# Patient Record
Sex: MALE | Race: WHITE | Employment: OTHER | URBAN - NONMETROPOLITAN AREA
[De-identification: names, ages, dates, MRNs, and addresses within clinical notes are randomized per-mention and may not be internally consistent; named-entity substitution may affect disease eponyms.]

---

## 2017-01-01 ENCOUNTER — APPOINTMENT (OUTPATIENT)
Dept: GENERAL RADIOLOGY | Age: 82
DRG: 690 | End: 2017-01-01
Payer: MEDICARE

## 2017-01-01 ENCOUNTER — PREP FOR PROCEDURE (OUTPATIENT)
Dept: CARDIOLOGY | Age: 82
End: 2017-01-01

## 2017-01-01 ENCOUNTER — HOSPITAL ENCOUNTER (EMERGENCY)
Age: 82
Discharge: HOME | End: 2017-07-29
Payer: MEDICARE

## 2017-01-01 ENCOUNTER — NURSE ONLY (OUTPATIENT)
Dept: CARDIOLOGY CLINIC | Age: 82
End: 2017-01-01
Payer: MEDICARE

## 2017-01-01 ENCOUNTER — HOSPITAL ENCOUNTER (EMERGENCY)
Age: 82
End: 2017-11-13
Attending: FAMILY MEDICINE
Payer: MEDICARE

## 2017-01-01 ENCOUNTER — APPOINTMENT (OUTPATIENT)
Dept: GENERAL RADIOLOGY | Age: 82
End: 2017-01-01
Attending: INTERNAL MEDICINE
Payer: MEDICARE

## 2017-01-01 ENCOUNTER — APPOINTMENT (OUTPATIENT)
Dept: CT IMAGING | Age: 82
DRG: 690 | End: 2017-01-01
Payer: MEDICARE

## 2017-01-01 ENCOUNTER — APPOINTMENT (OUTPATIENT)
Dept: CT IMAGING | Age: 82
End: 2017-01-01
Payer: MEDICARE

## 2017-01-01 ENCOUNTER — HOSPITAL ENCOUNTER (OUTPATIENT)
Dept: NON INVASIVE DIAGNOSTICS | Age: 82
Discharge: HOME | End: 2017-08-23
Payer: MEDICARE

## 2017-01-01 ENCOUNTER — APPOINTMENT (OUTPATIENT)
Dept: GENERAL RADIOLOGY | Age: 82
End: 2017-01-01
Payer: MEDICARE

## 2017-01-01 ENCOUNTER — TELEPHONE (OUTPATIENT)
Dept: PHARMACY | Age: 82
End: 2017-01-01

## 2017-01-01 ENCOUNTER — HOSPITAL ENCOUNTER (EMERGENCY)
Age: 82
Discharge: HOME | End: 2017-07-20
Attending: FAMILY MEDICINE
Payer: MEDICARE

## 2017-01-01 ENCOUNTER — TELEPHONE (OUTPATIENT)
Dept: CARDIOLOGY CLINIC | Age: 82
End: 2017-01-01

## 2017-01-01 ENCOUNTER — HOSPITAL ENCOUNTER (INPATIENT)
Age: 82
LOS: 4 days | Discharge: NURSING, REHAB OR LONG-TERM CARE | DRG: 690 | End: 2017-11-12
Attending: EMERGENCY MEDICINE | Admitting: INTERNAL MEDICINE
Payer: MEDICARE

## 2017-01-01 ENCOUNTER — OFFICE VISIT (OUTPATIENT)
Dept: CARDIOLOGY CLINIC | Age: 82
End: 2017-01-01
Payer: MEDICARE

## 2017-01-01 ENCOUNTER — HOSPITAL ENCOUNTER (EMERGENCY)
Age: 82
Discharge: HOME | End: 2017-11-03
Attending: INTERNAL MEDICINE
Payer: MEDICARE

## 2017-01-01 ENCOUNTER — HOSPITAL ENCOUNTER (OUTPATIENT)
Age: 82
Setting detail: OUTPATIENT SURGERY
Discharge: HOME | End: 2017-07-17
Attending: INTERNAL MEDICINE | Admitting: INTERNAL MEDICINE
Payer: MEDICARE

## 2017-01-01 ENCOUNTER — HOSPITAL ENCOUNTER (OUTPATIENT)
Dept: INPATIENT UNIT | Age: 82
Discharge: HOME | End: 2017-09-21
Attending: INTERNAL MEDICINE | Admitting: INTERNAL MEDICINE
Payer: MEDICARE

## 2017-01-01 VITALS
SYSTOLIC BLOOD PRESSURE: 157 MMHG | OXYGEN SATURATION: 100 % | TEMPERATURE: 98.1 F | DIASTOLIC BLOOD PRESSURE: 83 MMHG | BODY MASS INDEX: 18.61 KG/M2 | WEIGHT: 130 LBS | HEIGHT: 70 IN | RESPIRATION RATE: 16 BRPM | HEART RATE: 59 BPM

## 2017-01-01 VITALS
SYSTOLIC BLOOD PRESSURE: 122 MMHG | HEART RATE: 60 BPM | BODY MASS INDEX: 17.75 KG/M2 | HEIGHT: 70 IN | WEIGHT: 124 LBS | DIASTOLIC BLOOD PRESSURE: 60 MMHG

## 2017-01-01 VITALS
HEART RATE: 63 BPM | OXYGEN SATURATION: 96 % | TEMPERATURE: 98.6 F | HEIGHT: 69 IN | RESPIRATION RATE: 18 BRPM | SYSTOLIC BLOOD PRESSURE: 148 MMHG | DIASTOLIC BLOOD PRESSURE: 67 MMHG | BODY MASS INDEX: 19.54 KG/M2 | WEIGHT: 131.9 LBS

## 2017-01-01 VITALS
TEMPERATURE: 98 F | RESPIRATION RATE: 16 BRPM | OXYGEN SATURATION: 96 % | SYSTOLIC BLOOD PRESSURE: 139 MMHG | BODY MASS INDEX: 17.47 KG/M2 | WEIGHT: 122 LBS | HEART RATE: 60 BPM | DIASTOLIC BLOOD PRESSURE: 70 MMHG | HEIGHT: 70 IN

## 2017-01-01 VITALS
SYSTOLIC BLOOD PRESSURE: 149 MMHG | DIASTOLIC BLOOD PRESSURE: 63 MMHG | OXYGEN SATURATION: 99 % | RESPIRATION RATE: 18 BRPM | HEIGHT: 70 IN | TEMPERATURE: 98.2 F | HEART RATE: 59 BPM | BODY MASS INDEX: 19.33 KG/M2 | WEIGHT: 135 LBS

## 2017-01-01 VITALS
WEIGHT: 122 LBS | OXYGEN SATURATION: 98 % | BODY MASS INDEX: 17.47 KG/M2 | SYSTOLIC BLOOD PRESSURE: 195 MMHG | HEART RATE: 60 BPM | TEMPERATURE: 99 F | RESPIRATION RATE: 18 BRPM | DIASTOLIC BLOOD PRESSURE: 80 MMHG | HEIGHT: 70 IN

## 2017-01-01 VITALS
DIASTOLIC BLOOD PRESSURE: 59 MMHG | HEART RATE: 60 BPM | SYSTOLIC BLOOD PRESSURE: 94 MMHG | BODY MASS INDEX: 18.61 KG/M2 | WEIGHT: 130 LBS | HEIGHT: 70 IN

## 2017-01-01 VITALS
RESPIRATION RATE: 18 BRPM | HEART RATE: 60 BPM | WEIGHT: 122 LBS | HEIGHT: 70 IN | DIASTOLIC BLOOD PRESSURE: 75 MMHG | BODY MASS INDEX: 17.47 KG/M2 | SYSTOLIC BLOOD PRESSURE: 157 MMHG | TEMPERATURE: 98.2 F | OXYGEN SATURATION: 95 %

## 2017-01-01 VITALS
RESPIRATION RATE: 12 BRPM | DIASTOLIC BLOOD PRESSURE: 95 MMHG | OXYGEN SATURATION: 78 % | SYSTOLIC BLOOD PRESSURE: 149 MMHG | HEART RATE: 30 BPM | TEMPERATURE: 94.1 F

## 2017-01-01 VITALS
HEIGHT: 70 IN | DIASTOLIC BLOOD PRESSURE: 62 MMHG | WEIGHT: 125.6 LBS | SYSTOLIC BLOOD PRESSURE: 112 MMHG | HEART RATE: 60 BPM | BODY MASS INDEX: 17.98 KG/M2

## 2017-01-01 DIAGNOSIS — I25.810 CORONARY ARTERY DISEASE INVOLVING CORONARY BYPASS GRAFT OF NATIVE HEART WITHOUT ANGINA PECTORIS: Primary | ICD-10-CM

## 2017-01-01 DIAGNOSIS — I10 ESSENTIAL HYPERTENSION: ICD-10-CM

## 2017-01-01 DIAGNOSIS — Z95.0 S/P PLACEMENT OF CARDIAC PACEMAKER: ICD-10-CM

## 2017-01-01 DIAGNOSIS — E78.00 PURE HYPERCHOLESTEROLEMIA: ICD-10-CM

## 2017-01-01 DIAGNOSIS — S09.90XA INJURY OF HEAD, INITIAL ENCOUNTER: Primary | ICD-10-CM

## 2017-01-01 DIAGNOSIS — I48.92 PAROXYSMAL ATRIAL FLUTTER (HCC): ICD-10-CM

## 2017-01-01 DIAGNOSIS — I35.0 MODERATE TO SEVERE AORTIC STENOSIS: ICD-10-CM

## 2017-01-01 DIAGNOSIS — I35.0 MODERATE TO SEVERE AORTIC STENOSIS: Primary | ICD-10-CM

## 2017-01-01 DIAGNOSIS — R10.9 ACUTE RIGHT FLANK PAIN: Primary | ICD-10-CM

## 2017-01-01 DIAGNOSIS — I46.9 CARDIOPULMONARY ARREST (HCC): Primary | ICD-10-CM

## 2017-01-01 DIAGNOSIS — I50.32 HEART FAILURE, DIASTOLIC, CHRONIC (HCC): ICD-10-CM

## 2017-01-01 DIAGNOSIS — T14.8XXA SKIN AVULSION: ICD-10-CM

## 2017-01-01 DIAGNOSIS — I25.810 CORONARY ARTERY DISEASE INVOLVING CORONARY BYPASS GRAFT OF NATIVE HEART WITHOUT ANGINA PECTORIS: ICD-10-CM

## 2017-01-01 DIAGNOSIS — Z95.0 PACEMAKER: Primary | ICD-10-CM

## 2017-01-01 DIAGNOSIS — Z95.820 S/P ANGIOPLASTY WITH STENT: Primary | ICD-10-CM

## 2017-01-01 DIAGNOSIS — Z95.5 STENTED CORONARY ARTERY: ICD-10-CM

## 2017-01-01 DIAGNOSIS — N30.00 ACUTE CYSTITIS WITHOUT HEMATURIA: ICD-10-CM
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
## 2017-01-01 DIAGNOSIS — R51.9 NONINTRACTABLE HEADACHE, UNSPECIFIED CHRONICITY PATTERN, UNSPECIFIED HEADACHE TYPE: ICD-10-CM

## 2017-01-01 DIAGNOSIS — S51.802A AVULSION OF SKIN OF FOREARM, LEFT, INITIAL ENCOUNTER: Primary | ICD-10-CM

## 2017-01-01 DIAGNOSIS — I35.0 SEVERE AORTIC STENOSIS: ICD-10-CM

## 2017-01-01 DIAGNOSIS — Z95.1 HX OF CABG: ICD-10-CM

## 2017-01-01 DIAGNOSIS — Z98.890 S/P CARDIAC CATH: ICD-10-CM

## 2017-01-01 DIAGNOSIS — T07.XXXA MULTIPLE CONTUSIONS: ICD-10-CM

## 2017-01-01 LAB
ABO: NORMAL
ACTIVATED CLOTTING TIME: 345 SECONDS (ref 1–150)
ALBUMIN SERPL-MCNC: 3.2 G/DL (ref 3.5–5.1)
ALP BLD-CCNC: 106 U/L (ref 38–126)
ALT SERPL-CCNC: 16 U/L (ref 11–66)
ANION GAP SERPL CALCULATED.3IONS-SCNC: 11 MEQ/L (ref 8–16)
ANION GAP SERPL CALCULATED.3IONS-SCNC: 12 MEQ/L (ref 8–16)
ANION GAP SERPL CALCULATED.3IONS-SCNC: 15 MEQ/L (ref 8–16)
ANISOCYTOSIS: ABNORMAL
ANISOCYTOSIS: ABNORMAL
ANTIBODY SCREEN: NORMAL
APTT: 26 SECONDS (ref 22–38)
APTT: 28.8 SECONDS (ref 22–38)
AST SERPL-CCNC: 27 U/L (ref 5–40)
BACTERIA: ABNORMAL
BACTERIA: ABNORMAL /HPF
BASOPHILS # BLD: 0.6 %
BASOPHILS # BLD: 0.7 %
BASOPHILS # BLD: 1.1 %
BASOPHILS ABSOLUTE: 0 THOU/MM3 (ref 0–0.1)
BASOPHILS ABSOLUTE: 0 THOU/MM3 (ref 0–0.1)
BASOPHILS ABSOLUTE: 0.1 THOU/MM3 (ref 0–0.1)
BILIRUB SERPL-MCNC: 0.7 MG/DL (ref 0.3–1.2)
BILIRUBIN URINE: NEGATIVE
BILIRUBIN URINE: NEGATIVE
BLOOD, URINE: NEGATIVE
BLOOD, URINE: NEGATIVE
BUN BLDV-MCNC: 21 MG/DL (ref 7–22)
BUN BLDV-MCNC: 22 MG/DL (ref 7–22)
BUN BLDV-MCNC: 22 MG/DL (ref 7–22)
BUN BLDV-MCNC: 23 MG/DL (ref 7–22)
BUN BLDV-MCNC: 25 MG/DL (ref 7–22)
BUN BLDV-MCNC: 27 MG/DL (ref 7–22)
BUN BLDV-MCNC: 27 MG/DL (ref 7–22)
BUN BLDV-MCNC: 35 MG/DL (ref 7–22)
BUN BLDV-MCNC: 37 MG/DL (ref 7–22)
CALCIUM IONIZED: 1.14 MMOL/L (ref 1.12–1.32)
CALCIUM SERPL-MCNC: 8.6 MG/DL (ref 8.5–10.5)
CALCIUM SERPL-MCNC: 8.8 MG/DL (ref 8.5–10.5)
CALCIUM SERPL-MCNC: 8.8 MG/DL (ref 8.5–10.5)
CALCIUM SERPL-MCNC: 8.9 MG/DL (ref 8.5–10.5)
CALCIUM SERPL-MCNC: 9 MG/DL (ref 8.5–10.5)
CALCIUM SERPL-MCNC: 9.5 MG/DL (ref 8.5–10.5)
CALCIUM SERPL-MCNC: 9.5 MG/DL (ref 8.5–10.5)
CALCIUM SERPL-MCNC: 9.8 MG/DL (ref 8.5–10.5)
CALCIUM SERPL-MCNC: 9.9 MG/DL (ref 8.5–10.5)
CASTS 2: ABNORMAL /LPF
CASTS UA: ABNORMAL /LPF
CASTS: ABNORMAL /LPF
CASTS: ABNORMAL /LPF
CHARACTER, URINE: ABNORMAL
CHARACTER, URINE: ABNORMAL
CHLORIDE BLD-SCNC: 101 MEQ/L (ref 98–111)
CHLORIDE BLD-SCNC: 102 MEQ/L (ref 98–111)
CHLORIDE BLD-SCNC: 103 MEQ/L (ref 98–111)
CHLORIDE BLD-SCNC: 103 MEQ/L (ref 98–111)
CHLORIDE BLD-SCNC: 104 MEQ/L (ref 98–111)
CHLORIDE BLD-SCNC: 99 MEQ/L (ref 98–111)
CO2: 17 MEQ/L (ref 23–33)
CO2: 18 MEQ/L (ref 23–33)
CO2: 19 MEQ/L (ref 23–33)
CO2: 22 MEQ/L (ref 23–33)
CO2: 22 MEQ/L (ref 23–33)
CO2: 23 MEQ/L (ref 23–33)
CO2: 24 MEQ/L (ref 23–33)
COLLECTED BY:: ABNORMAL
COLOR: YELLOW
COLOR: YELLOW
CREAT SERPL-MCNC: 0.5 MG/DL (ref 0.4–1.2)
CREAT SERPL-MCNC: 0.6 MG/DL (ref 0.4–1.2)
CREAT SERPL-MCNC: 0.7 MG/DL (ref 0.4–1.2)
CREAT SERPL-MCNC: 0.8 MG/DL (ref 0.4–1.2)
CRYSTALS, UA: ABNORMAL
CRYSTALS: ABNORMAL
EKG ATRIAL RATE: 326 BPM
EKG ATRIAL RATE: 394 BPM
EKG ATRIAL RATE: 60 BPM
EKG Q-T INTERVAL: 460 MS
EKG Q-T INTERVAL: 476 MS
EKG Q-T INTERVAL: 478 MS
EKG QRS DURATION: 158 MS
EKG QRS DURATION: 162 MS
EKG QRS DURATION: 168 MS
EKG QTC CALCULATION (BAZETT): 460 MS
EKG QTC CALCULATION (BAZETT): 476 MS
EKG QTC CALCULATION (BAZETT): 478 MS
EKG R AXIS: -77 DEGREES
EKG R AXIS: -77 DEGREES
EKG R AXIS: -79 DEGREES
EKG T AXIS: 88 DEGREES
EKG T AXIS: 90 DEGREES
EKG T AXIS: 91 DEGREES
EKG VENTRICULAR RATE: 60 BPM
EOSINOPHIL # BLD: 0 %
EOSINOPHIL # BLD: 0.1 %
EOSINOPHIL # BLD: 1.6 %
EOSINOPHILS ABSOLUTE: 0 THOU/MM3 (ref 0–0.4)
EOSINOPHILS ABSOLUTE: 0 THOU/MM3 (ref 0–0.4)
EOSINOPHILS ABSOLUTE: 0.1 THOU/MM3 (ref 0–0.4)
EPITHELIAL CELLS, UA: ABNORMAL /HPF
EPITHELIAL CELLS, UA: ABNORMAL /HPF
GFR SERPL CREATININE-BSD FRML MDRD: > 90 ML/MIN/1.73M2
GLUCOSE BLD-MCNC: 103 MG/DL (ref 70–108)
GLUCOSE BLD-MCNC: 124 MG/DL (ref 70–108)
GLUCOSE BLD-MCNC: 125 MG/DL (ref 70–108)
GLUCOSE BLD-MCNC: 133 MG/DL (ref 70–108)
GLUCOSE BLD-MCNC: 133 MG/DL (ref 70–108)
GLUCOSE BLD-MCNC: 135 MG/DL (ref 70–108)
GLUCOSE BLD-MCNC: 140 MG/DL (ref 70–108)
GLUCOSE BLD-MCNC: 172 MG/DL (ref 70–108)
GLUCOSE BLD-MCNC: 185 MG/DL (ref 70–108)
GLUCOSE BLD-MCNC: 194 MG/DL (ref 70–108)
GLUCOSE BLD-MCNC: 200 MG/DL (ref 70–108)
GLUCOSE BLD-MCNC: 216 MG/DL (ref 70–108)
GLUCOSE BLD-MCNC: 220 MG/DL (ref 70–108)
GLUCOSE BLD-MCNC: 225 MG/DL (ref 70–108)
GLUCOSE BLD-MCNC: 231 MG/DL (ref 70–108)
GLUCOSE BLD-MCNC: 235 MG/DL (ref 70–108)
GLUCOSE BLD-MCNC: 237 MG/DL (ref 70–108)
GLUCOSE BLD-MCNC: 237 MG/DL (ref 70–108)
GLUCOSE BLD-MCNC: 238 MG/DL (ref 70–108)
GLUCOSE BLD-MCNC: 242 MG/DL (ref 70–108)
GLUCOSE BLD-MCNC: 246 MG/DL (ref 70–108)
GLUCOSE BLD-MCNC: 252 MG/DL (ref 70–108)
GLUCOSE BLD-MCNC: 274 MG/DL (ref 70–108)
GLUCOSE BLD-MCNC: 280 MG/DL (ref 70–108)
GLUCOSE BLD-MCNC: 283 MG/DL (ref 70–108)
GLUCOSE BLD-MCNC: 287 MG/DL (ref 70–108)
GLUCOSE BLD-MCNC: 297 MG/DL (ref 70–108)
GLUCOSE BLD-MCNC: 302 MG/DL (ref 70–108)
GLUCOSE BLD-MCNC: 324 MG/DL (ref 70–108)
GLUCOSE BLD-MCNC: 348 MG/DL (ref 70–108)
GLUCOSE BLD-MCNC: 350 MG/DL (ref 70–108)
GLUCOSE BLD-MCNC: 74 MG/DL (ref 70–108)
GLUCOSE BLD-MCNC: 87 MG/DL (ref 70–108)
GLUCOSE BLD-MCNC: 92 MG/DL (ref 70–108)
GLUCOSE BLD-MCNC: 94 MG/DL (ref 70–108)
GLUCOSE URINE: NEGATIVE MG/DL
GLUCOSE, URINE: 100 MG/DL
HCT VFR BLD CALC: 25.3 % (ref 42–52)
HCT VFR BLD CALC: 26.1 % (ref 42–52)
HCT VFR BLD CALC: 27.8 % (ref 42–52)
HCT VFR BLD CALC: 27.8 % (ref 42–52)
HCT VFR BLD CALC: 28.4 % (ref 42–52)
HCT VFR BLD CALC: 29.3 % (ref 42–52)
HCT VFR BLD CALC: 29.8 % (ref 42–52)
HCT VFR BLD CALC: 30.7 % (ref 42–52)
HEMOCCULT STL QL: NEGATIVE
HEMOGLOBIN: 10 GM/DL (ref 14–18)
HEMOGLOBIN: 8.2 GM/DL (ref 14–18)
HEMOGLOBIN: 8.6 GM/DL (ref 14–18)
HEMOGLOBIN: 9.1 GM/DL (ref 14–18)
HEMOGLOBIN: 9.3 GM/DL (ref 14–18)
HEMOGLOBIN: 9.5 GM/DL (ref 14–18)
HEMOGLOBIN: 9.6 GM/DL (ref 14–18)
HEMOGLOBIN: 9.9 GM/DL (ref 14–18)
HYPOCHROMIA: ABNORMAL
INR BLD: 1 (ref 0.85–1.13)
INR BLD: 1.13 (ref 0.85–1.13)
INR BLD: 2.09 (ref 0.85–1.13)
INR BLD: 2.24 (ref 0.85–1.13)
INR BLD: 2.53 (ref 0.85–1.13)
INR BLD: 2.73 (ref 0.85–1.13)
INR BLD: 3.19 (ref 0.85–1.13)
INR BLD: 3.62 (ref 0.85–1.13)
KETONES, URINE: ABNORMAL
KETONES, URINE: NEGATIVE
LACTIC ACID: 1.9 MMOL/L (ref 0.5–2.2)
LACTIC ACID: 2.4 MMOL/L (ref 0.5–2.2)
LACTIC ACID: 2.4 MMOL/L (ref 0.5–2.2)
LACTIC ACID: 2.5 MMOL/L (ref 0.5–2.2)
LACTIC ACID: 2.6 MMOL/L (ref 0.5–2.2)
LACTIC ACID: 3.2 MMOL/L (ref 0.5–2.2)
LACTIC ACID: 3.3 MMOL/L (ref 0.5–2.2)
LACTIC ACID: 3.5 MMOL/L (ref 0.5–2.2)
LACTIC ACID: 3.6 MMOL/L (ref 0.5–2.2)
LEUKOCYTE ESTERASE, URINE: ABNORMAL
LEUKOCYTE ESTERASE, URINE: ABNORMAL
LV EF: 60 %
LVEF MODALITY: NORMAL
LYMPHOCYTES # BLD: 12.7 %
LYMPHOCYTES # BLD: 24.9 %
LYMPHOCYTES # BLD: 32.1 %
LYMPHOCYTES ABSOLUTE: 1 THOU/MM3 (ref 1–4.8)
LYMPHOCYTES ABSOLUTE: 1.4 THOU/MM3 (ref 1–4.8)
LYMPHOCYTES ABSOLUTE: 2.1 THOU/MM3 (ref 1–4.8)
MAGNESIUM: 1.6 MG/DL (ref 1.6–2.4)
MAGNESIUM: 1.8 MG/DL (ref 1.6–2.4)
MAGNESIUM: 2.1 MG/DL (ref 1.6–2.4)
MCH RBC QN AUTO: 26.7 PG (ref 27–31)
MCH RBC QN AUTO: 26.9 PG (ref 27–31)
MCH RBC QN AUTO: 27.1 PG (ref 27–31)
MCH RBC QN AUTO: 27.3 PG (ref 27–31)
MCH RBC QN AUTO: 27.5 PG (ref 27–31)
MCH RBC QN AUTO: 27.6 PG (ref 27–31)
MCH RBC QN AUTO: 27.6 PG (ref 27–31)
MCH RBC QN AUTO: 30.8 PG (ref 27–31)
MCHC RBC AUTO-ENTMCNC: 32.5 GM/DL (ref 33–37)
MCHC RBC AUTO-ENTMCNC: 32.6 GM/DL (ref 33–37)
MCHC RBC AUTO-ENTMCNC: 32.8 GM/DL (ref 33–37)
MCHC RBC AUTO-ENTMCNC: 32.8 GM/DL (ref 33–37)
MCHC RBC AUTO-ENTMCNC: 33 GM/DL (ref 33–37)
MCHC RBC AUTO-ENTMCNC: 33.3 GM/DL (ref 33–37)
MCHC RBC AUTO-ENTMCNC: 33.3 GM/DL (ref 33–37)
MCHC RBC AUTO-ENTMCNC: 33.6 GM/DL (ref 33–37)
MCV RBC AUTO: 81.9 FL (ref 80–94)
MCV RBC AUTO: 82.2 FL (ref 80–94)
MCV RBC AUTO: 82.3 FL (ref 80–94)
MCV RBC AUTO: 82.7 FL (ref 80–94)
MCV RBC AUTO: 82.7 FL (ref 80–94)
MCV RBC AUTO: 83.2 FL (ref 80–94)
MCV RBC AUTO: 84.7 FL (ref 80–94)
MCV RBC AUTO: 91.5 FL (ref 80–94)
MISCELLANEOUS 2: ABNORMAL
MISCELLANEOUS LAB TEST RESULT: ABNORMAL
MONOCYTES # BLD: 6.2 %
MONOCYTES # BLD: 6.9 %
MONOCYTES # BLD: 9.1 %
MONOCYTES ABSOLUTE: 0.4 THOU/MM3 (ref 0.4–1.3)
MONOCYTES ABSOLUTE: 0.5 THOU/MM3 (ref 0.4–1.3)
MONOCYTES ABSOLUTE: 0.5 THOU/MM3 (ref 0.4–1.3)
MYOGLOBIN URINE: NEGATIVE
NITRITE, URINE: POSITIVE
NITRITE, URINE: POSITIVE
NUCLEATED RED BLOOD CELLS: 0 /100 WBC
ORGANISM: ABNORMAL
ORGANISM: ABNORMAL
OSMOLALITY CALCULATION: 288.2 MOSMOL/KG (ref 275–300)
OSMOLALITY CALCULATION: 289.3 MOSMOL/KG (ref 275–300)
OSMOLALITY CALCULATION: 292 MOSMOL/KG (ref 275–300)
PDW BLD-RTO: 14.2 % (ref 11.5–14.5)
PDW BLD-RTO: 15.5 % (ref 11.5–14.5)
PDW BLD-RTO: 17.5 % (ref 11.5–14.5)
PDW BLD-RTO: 17.8 % (ref 11.5–14.5)
PDW BLD-RTO: 18.2 % (ref 11.5–14.5)
PDW BLD-RTO: 18.7 % (ref 11.5–14.5)
PDW BLD-RTO: 18.7 % (ref 11.5–14.5)
PDW BLD-RTO: 19.1 % (ref 11.5–14.5)
PH UA: 6.5
PH UA: 8
PLATELET # BLD: 113 THOU/MM3 (ref 130–400)
PLATELET # BLD: 134 THOU/MM3 (ref 130–400)
PLATELET # BLD: 136 THOU/MM3 (ref 130–400)
PLATELET # BLD: 136 THOU/MM3 (ref 130–400)
PLATELET # BLD: 144 THOU/MM3 (ref 130–400)
PLATELET # BLD: 150 THOU/MM3 (ref 130–400)
PLATELET # BLD: 159 THOU/MM3 (ref 130–400)
PLATELET # BLD: 165 THOU/MM3 (ref 130–400)
PMV BLD AUTO: 10.4 MCM (ref 7.4–10.4)
PMV BLD AUTO: 10.5 MCM (ref 7.4–10.4)
PMV BLD AUTO: 10.6 MCM (ref 7.4–10.4)
PMV BLD AUTO: 10.6 MCM (ref 7.4–10.4)
PMV BLD AUTO: 10.8 MCM (ref 7.4–10.4)
PMV BLD AUTO: 11.1 MCM (ref 7.4–10.4)
PMV BLD AUTO: 11.1 MCM (ref 7.4–10.4)
PMV BLD AUTO: 11.3 MCM (ref 7.4–10.4)
POC O2 SATURATION: 55 % (ref 94–97)
POC O2 SATURATION: 55 % (ref 94–97)
POC O2 SATURATION: 57 % (ref 94–97)
POTASSIUM SERPL-SCNC: 3.5 MEQ/L (ref 3.5–5.2)
POTASSIUM SERPL-SCNC: 3.9 MEQ/L (ref 3.5–5.2)
POTASSIUM SERPL-SCNC: 4.1 MEQ/L (ref 3.5–5.2)
POTASSIUM SERPL-SCNC: 4.5 MEQ/L (ref 3.5–5.2)
POTASSIUM SERPL-SCNC: 4.6 MEQ/L (ref 3.5–5.2)
POTASSIUM SERPL-SCNC: 4.7 MEQ/L (ref 3.5–5.2)
POTASSIUM SERPL-SCNC: 4.9 MEQ/L (ref 3.5–5.2)
POTASSIUM SERPL-SCNC: 5.3 MEQ/L (ref 3.5–5.2)
POTASSIUM SERPL-SCNC: 5.6 MEQ/L (ref 3.5–5.2)
PRO-BNP: ABNORMAL PG/ML (ref 0–1800)
PROCALCITONIN: 0.07 NG/ML (ref 0.01–0.09)
PROTEIN UA: ABNORMAL
PROTEIN UA: ABNORMAL MG/DL
RBC # BLD: 3.08 MILL/MM3 (ref 4.7–6.1)
RBC # BLD: 3.1 MILL/MM3 (ref 4.7–6.1)
RBC # BLD: 3.17 MILL/MM3 (ref 4.7–6.1)
RBC # BLD: 3.34 MILL/MM3 (ref 4.7–6.1)
RBC # BLD: 3.36 MILL/MM3 (ref 4.7–6.1)
RBC # BLD: 3.58 MILL/MM3 (ref 4.7–6.1)
RBC # BLD: 3.61 MILL/MM3 (ref 4.7–6.1)
RBC # BLD: 3.62 MILL/MM3 (ref 4.7–6.1)
RBC # BLD: NORMAL 10*6/UL
RBC URINE: ABNORMAL /HPF
RBC URINE: ABNORMAL /HPF
RENAL EPITHELIAL, UA: ABNORMAL
RENAL EPITHELIAL, UA: ABNORMAL
RH FACTOR: NORMAL
SEG NEUTROPHILS: 58.3 %
SEG NEUTROPHILS: 65.3 %
SEG NEUTROPHILS: 80.4 %
SEGMENTED NEUTROPHILS ABSOLUTE COUNT: 3.7 THOU/MM3 (ref 1.8–7.7)
SEGMENTED NEUTROPHILS ABSOLUTE COUNT: 3.7 THOU/MM3 (ref 1.8–7.7)
SEGMENTED NEUTROPHILS ABSOLUTE COUNT: 6.2 THOU/MM3 (ref 1.8–7.7)
SODIUM BLD-SCNC: 133 MEQ/L (ref 135–145)
SODIUM BLD-SCNC: 135 MEQ/L (ref 135–145)
SODIUM BLD-SCNC: 135 MEQ/L (ref 135–145)
SODIUM BLD-SCNC: 136 MEQ/L (ref 135–145)
SODIUM BLD-SCNC: 136 MEQ/L (ref 135–145)
SODIUM BLD-SCNC: 137 MEQ/L (ref 135–145)
SODIUM BLD-SCNC: 137 MEQ/L (ref 135–145)
SODIUM BLD-SCNC: 138 MEQ/L (ref 135–145)
SODIUM BLD-SCNC: 138 MEQ/L (ref 135–145)
SOURCE, BLOOD GAS: ABNORMAL
SPECIFIC GRAVITY UA: 1.02 (ref 1–1.03)
SPECIFIC GRAVITY, URINE: 1.02 (ref 1–1.03)
TOTAL CK: 97 U/L (ref 55–170)
TOTAL PROTEIN: 6.7 G/DL (ref 6.1–8)
TROPONIN T: 0.06 NG/ML
TSH SERPL DL<=0.05 MIU/L-ACNC: 3.02 UIU/ML (ref 0.4–4.2)
URINE CULTURE REFLEX: ABNORMAL
URINE CULTURE, ROUTINE: ABNORMAL
UROBILINOGEN, URINE: 1 EU/DL
UROBILINOGEN, URINE: 1 EU/DL
WBC # BLD: 10 THOU/MM3 (ref 4.8–10.8)
WBC # BLD: 5.5 THOU/MM3 (ref 4.8–10.8)
WBC # BLD: 5.6 THOU/MM3 (ref 4.8–10.8)
WBC # BLD: 6.4 THOU/MM3 (ref 4.8–10.8)
WBC # BLD: 6.7 THOU/MM3 (ref 4.8–10.8)
WBC # BLD: 7.1 THOU/MM3 (ref 4.8–10.8)
WBC # BLD: 7.7 THOU/MM3 (ref 4.8–10.8)
WBC # BLD: 8.8 THOU/MM3 (ref 4.8–10.8)
WBC UA: ABNORMAL /HPF
WBC UA: ABNORMAL /HPF
YEAST: ABNORMAL
YEAST: ABNORMAL

## 2017-01-01 PROCEDURE — 49465 FLUORO EXAM OF G/COLON TUBE: CPT

## 2017-01-01 PROCEDURE — G8988 SELF CARE GOAL STATUS: HCPCS

## 2017-01-01 PROCEDURE — 73600 X-RAY EXAM OF ANKLE: CPT

## 2017-01-01 PROCEDURE — 93000 ELECTROCARDIOGRAM COMPLETE: CPT | Performed by: INTERNAL MEDICINE

## 2017-01-01 PROCEDURE — 1200000003 HC TELEMETRY R&B

## 2017-01-01 PROCEDURE — 2580000003 HC RX 258: Performed by: NURSE PRACTITIONER

## 2017-01-01 PROCEDURE — 97110 THERAPEUTIC EXERCISES: CPT

## 2017-01-01 PROCEDURE — 2500000003 HC RX 250 WO HCPCS: Performed by: INTERNAL MEDICINE

## 2017-01-01 PROCEDURE — 2580000003 HC RX 258: Performed by: INTERNAL MEDICINE

## 2017-01-01 PROCEDURE — A6446 CONFORM BAND S W>=3" <5"/YD: HCPCS

## 2017-01-01 PROCEDURE — C1894 INTRO/SHEATH, NON-LASER: HCPCS

## 2017-01-01 PROCEDURE — 85610 PROTHROMBIN TIME: CPT

## 2017-01-01 PROCEDURE — 84145 PROCALCITONIN (PCT): CPT

## 2017-01-01 PROCEDURE — 82948 REAGENT STRIP/BLOOD GLUCOSE: CPT

## 2017-01-01 PROCEDURE — 81001 URINALYSIS AUTO W/SCOPE: CPT

## 2017-01-01 PROCEDURE — A6222 GAUZE <=16 IN NO W/SAL W/O B: HCPCS

## 2017-01-01 PROCEDURE — G8598 ASA/ANTIPLAT THER USED: HCPCS | Performed by: INTERNAL MEDICINE

## 2017-01-01 PROCEDURE — 85027 COMPLETE CBC AUTOMATED: CPT

## 2017-01-01 PROCEDURE — 85025 COMPLETE CBC W/AUTO DIFF WBC: CPT

## 2017-01-01 PROCEDURE — G8427 DOCREV CUR MEDS BY ELIG CLIN: HCPCS | Performed by: INTERNAL MEDICINE

## 2017-01-01 PROCEDURE — 93005 ELECTROCARDIOGRAM TRACING: CPT

## 2017-01-01 PROCEDURE — 92937 PRQ TRLUML REVSC CAB GRF 1: CPT | Performed by: INTERNAL MEDICINE

## 2017-01-01 PROCEDURE — 96374 THER/PROPH/DIAG INJ IV PUSH: CPT

## 2017-01-01 PROCEDURE — 6370000000 HC RX 637 (ALT 250 FOR IP): Performed by: PHYSICIAN ASSISTANT

## 2017-01-01 PROCEDURE — 93306 TTE W/DOPPLER COMPLETE: CPT

## 2017-01-01 PROCEDURE — 6360000002 HC RX W HCPCS: Performed by: INTERNAL MEDICINE

## 2017-01-01 PROCEDURE — 80048 BASIC METABOLIC PNL TOTAL CA: CPT

## 2017-01-01 PROCEDURE — C1887 CATHETER, GUIDING: HCPCS

## 2017-01-01 PROCEDURE — 99232 SBSQ HOSP IP/OBS MODERATE 35: CPT | Performed by: PHYSICIAN ASSISTANT

## 2017-01-01 PROCEDURE — 84443 ASSAY THYROID STIM HORMONE: CPT

## 2017-01-01 PROCEDURE — 6370000000 HC RX 637 (ALT 250 FOR IP): Performed by: INTERNAL MEDICINE

## 2017-01-01 PROCEDURE — A6258 TRANSPARENT FILM >16<=48 IN: HCPCS

## 2017-01-01 PROCEDURE — A6223 GAUZE >16<=48 NO W/SAL W/O B: HCPCS

## 2017-01-01 PROCEDURE — 96375 TX/PRO/DX INJ NEW DRUG ADDON: CPT

## 2017-01-01 PROCEDURE — G8987 SELF CARE CURRENT STATUS: HCPCS

## 2017-01-01 PROCEDURE — 36415 COLL VENOUS BLD VENIPUNCTURE: CPT

## 2017-01-01 PROCEDURE — 99233 SBSQ HOSP IP/OBS HIGH 50: CPT | Performed by: PHYSICIAN ASSISTANT

## 2017-01-01 PROCEDURE — 99217 PR OBSERVATION CARE DISCHARGE MANAGEMENT: CPT | Performed by: PHYSICIAN ASSISTANT

## 2017-01-01 PROCEDURE — 70450 CT HEAD/BRAIN W/O DYE: CPT

## 2017-01-01 PROCEDURE — G8979 MOBILITY GOAL STATUS: HCPCS

## 2017-01-01 PROCEDURE — 93279 PRGRMG DEV EVAL PM/LDLS PM: CPT | Performed by: INTERNAL MEDICINE

## 2017-01-01 PROCEDURE — C1874 STENT, COATED/COV W/DEL SYS: HCPCS

## 2017-01-01 PROCEDURE — 97166 OT EVAL MOD COMPLEX 45 MIN: CPT

## 2017-01-01 PROCEDURE — 82810 BLOOD GASES O2 SAT ONLY: CPT

## 2017-01-01 PROCEDURE — 87086 URINE CULTURE/COLONY COUNT: CPT

## 2017-01-01 PROCEDURE — 6360000002 HC RX W HCPCS: Performed by: EMERGENCY MEDICINE

## 2017-01-01 PROCEDURE — 82550 ASSAY OF CK (CPK): CPT

## 2017-01-01 PROCEDURE — 97530 THERAPEUTIC ACTIVITIES: CPT

## 2017-01-01 PROCEDURE — C9604 PERC D-E COR REVASC T CABG S: HCPCS | Performed by: INTERNAL MEDICINE

## 2017-01-01 PROCEDURE — C1769 GUIDE WIRE: HCPCS

## 2017-01-01 PROCEDURE — 73502 X-RAY EXAM HIP UNI 2-3 VIEWS: CPT

## 2017-01-01 PROCEDURE — 7100000000 HC PACU RECOVERY - FIRST 15 MIN: Performed by: INTERNAL MEDICINE

## 2017-01-01 PROCEDURE — 72100 X-RAY EXAM L-S SPINE 2/3 VWS: CPT

## 2017-01-01 PROCEDURE — 92950 HEART/LUNG RESUSCITATION CPR: CPT

## 2017-01-01 PROCEDURE — 90715 TDAP VACCINE 7 YRS/> IM: CPT | Performed by: FAMILY MEDICINE

## 2017-01-01 PROCEDURE — 2720000010 HC SURG SUPPLY STERILE

## 2017-01-01 PROCEDURE — 87077 CULTURE AEROBIC IDENTIFY: CPT

## 2017-01-01 PROCEDURE — 99284 EMERGENCY DEPT VISIT MOD MDM: CPT

## 2017-01-01 PROCEDURE — 99214 OFFICE O/P EST MOD 30 MIN: CPT | Performed by: INTERNAL MEDICINE

## 2017-01-01 PROCEDURE — 82272 OCCULT BLD FECES 1-3 TESTS: CPT

## 2017-01-01 PROCEDURE — 6370000000 HC RX 637 (ALT 250 FOR IP)

## 2017-01-01 PROCEDURE — 2580000003 HC RX 258

## 2017-01-01 PROCEDURE — 6370000000 HC RX 637 (ALT 250 FOR IP): Performed by: PHARMACIST

## 2017-01-01 PROCEDURE — 6360000002 HC RX W HCPCS: Performed by: FAMILY MEDICINE

## 2017-01-01 PROCEDURE — 1123F ACP DISCUSS/DSCN MKR DOCD: CPT | Performed by: INTERNAL MEDICINE

## 2017-01-01 PROCEDURE — 4040F PNEUMOC VAC/ADMIN/RCVD: CPT | Performed by: INTERNAL MEDICINE

## 2017-01-01 PROCEDURE — 6360000004 HC RX CONTRAST MEDICATION: Performed by: INTERNAL MEDICINE

## 2017-01-01 PROCEDURE — 85347 COAGULATION TIME ACTIVATED: CPT

## 2017-01-01 PROCEDURE — G8420 CALC BMI NORM PARAMETERS: HCPCS | Performed by: INTERNAL MEDICINE

## 2017-01-01 PROCEDURE — 86900 BLOOD TYPING SEROLOGIC ABO: CPT

## 2017-01-01 PROCEDURE — 83874 ASSAY OF MYOGLOBIN: CPT

## 2017-01-01 PROCEDURE — 1036F TOBACCO NON-USER: CPT | Performed by: INTERNAL MEDICINE

## 2017-01-01 PROCEDURE — 96361 HYDRATE IV INFUSION ADD-ON: CPT

## 2017-01-01 PROCEDURE — 83880 ASSAY OF NATRIURETIC PEPTIDE: CPT

## 2017-01-01 PROCEDURE — 99233 SBSQ HOSP IP/OBS HIGH 50: CPT | Performed by: INTERNAL MEDICINE

## 2017-01-01 PROCEDURE — 83735 ASSAY OF MAGNESIUM: CPT

## 2017-01-01 PROCEDURE — 6360000002 HC RX W HCPCS: Performed by: PHYSICIAN ASSISTANT

## 2017-01-01 PROCEDURE — 99283 EMERGENCY DEPT VISIT LOW MDM: CPT

## 2017-01-01 PROCEDURE — 2500000003 HC RX 250 WO HCPCS: Performed by: PHYSICIAN ASSISTANT

## 2017-01-01 PROCEDURE — 99239 HOSP IP/OBS DSCHRG MGMT >30: CPT | Performed by: PHYSICIAN ASSISTANT

## 2017-01-01 PROCEDURE — 72125 CT NECK SPINE W/O DYE: CPT

## 2017-01-01 PROCEDURE — C1889 IMPLANT/INSERT DEVICE, NOC: HCPCS | Performed by: INTERNAL MEDICINE

## 2017-01-01 PROCEDURE — 99285 EMERGENCY DEPT VISIT HI MDM: CPT

## 2017-01-01 PROCEDURE — C1725 CATH, TRANSLUMIN NON-LASER: HCPCS

## 2017-01-01 PROCEDURE — 2580000003 HC RX 258: Performed by: PHYSICIAN ASSISTANT

## 2017-01-01 PROCEDURE — 93457 R HRT ART/GRFT ANGIO: CPT | Performed by: INTERNAL MEDICINE

## 2017-01-01 PROCEDURE — 83605 ASSAY OF LACTIC ACID: CPT

## 2017-01-01 PROCEDURE — 73590 X-RAY EXAM OF LOWER LEG: CPT

## 2017-01-01 PROCEDURE — G8978 MOBILITY CURRENT STATUS: HCPCS

## 2017-01-01 PROCEDURE — 99215 OFFICE O/P EST HI 40 MIN: CPT | Performed by: INTERNAL MEDICINE

## 2017-01-01 PROCEDURE — 80053 COMPREHEN METABOLIC PANEL: CPT

## 2017-01-01 PROCEDURE — 90471 IMMUNIZATION ADMIN: CPT | Performed by: FAMILY MEDICINE

## 2017-01-01 PROCEDURE — G8484 FLU IMMUNIZE NO ADMIN: HCPCS | Performed by: INTERNAL MEDICINE

## 2017-01-01 PROCEDURE — 2580000003 HC RX 258: Performed by: EMERGENCY MEDICINE

## 2017-01-01 PROCEDURE — A4421 OSTOMY SUPPLY MISC: HCPCS

## 2017-01-01 PROCEDURE — 99222 1ST HOSP IP/OBS MODERATE 55: CPT | Performed by: INTERNAL MEDICINE

## 2017-01-01 PROCEDURE — 93456 R HRT CORONARY ARTERY ANGIO: CPT | Performed by: INTERNAL MEDICINE

## 2017-01-01 PROCEDURE — 97535 SELF CARE MNGMENT TRAINING: CPT

## 2017-01-01 PROCEDURE — 82330 ASSAY OF CALCIUM: CPT

## 2017-01-01 PROCEDURE — 76376 3D RENDER W/INTRP POSTPROCES: CPT

## 2017-01-01 PROCEDURE — 3609013300 HC EGD TUBE PLACEMENT: Performed by: INTERNAL MEDICINE

## 2017-01-01 PROCEDURE — 86901 BLOOD TYPING SEROLOGIC RH(D): CPT

## 2017-01-01 PROCEDURE — 99223 1ST HOSP IP/OBS HIGH 75: CPT | Performed by: THORACIC SURGERY (CARDIOTHORACIC VASCULAR SURGERY)

## 2017-01-01 PROCEDURE — G0378 HOSPITAL OBSERVATION PER HR: HCPCS

## 2017-01-01 PROCEDURE — 71010 XR CHEST PORTABLE: CPT

## 2017-01-01 PROCEDURE — 85730 THROMBOPLASTIN TIME PARTIAL: CPT

## 2017-01-01 PROCEDURE — 93005 ELECTROCARDIOGRAM TRACING: CPT | Performed by: INTERNAL MEDICINE

## 2017-01-01 PROCEDURE — 2780000010 HC IMPLANT OTHER

## 2017-01-01 PROCEDURE — 97162 PT EVAL MOD COMPLEX 30 MIN: CPT

## 2017-01-01 PROCEDURE — 99223 1ST HOSP IP/OBS HIGH 75: CPT | Performed by: INTERNAL MEDICINE

## 2017-01-01 PROCEDURE — 86850 RBC ANTIBODY SCREEN: CPT

## 2017-01-01 PROCEDURE — G8419 CALC BMI OUT NRM PARAM NOF/U: HCPCS | Performed by: INTERNAL MEDICINE

## 2017-01-01 PROCEDURE — C1884 EMBOLIZATION PROTECT SYST: HCPCS

## 2017-01-01 PROCEDURE — 74176 CT ABD & PELVIS W/O CONTRAST: CPT

## 2017-01-01 PROCEDURE — A6454 SELF-ADHER BAND W>=3" <5"/YD: HCPCS

## 2017-01-01 PROCEDURE — 2500000003 HC RX 250 WO HCPCS

## 2017-01-01 PROCEDURE — 51702 INSERT TEMP BLADDER CATH: CPT

## 2017-01-01 PROCEDURE — 84484 ASSAY OF TROPONIN QUANT: CPT

## 2017-01-01 PROCEDURE — 6360000002 HC RX W HCPCS

## 2017-01-01 RX ORDER — SODIUM CHLORIDE 0.9 % (FLUSH) 0.9 %
10 SYRINGE (ML) INJECTION PRN
Status: DISCONTINUED | OUTPATIENT
Start: 2017-01-01 | End: 2017-01-01 | Stop reason: SDUPTHER

## 2017-01-01 RX ORDER — DIPHENHYDRAMINE HYDROCHLORIDE 50 MG/ML
50 INJECTION INTRAMUSCULAR; INTRAVENOUS ONCE
Status: CANCELLED | OUTPATIENT
Start: 2017-01-01 | End: 2017-01-01

## 2017-01-01 RX ORDER — SODIUM CHLORIDE 0.9 % (FLUSH) 0.9 %
10 SYRINGE (ML) INJECTION PRN
Status: DISCONTINUED | OUTPATIENT
Start: 2017-01-01 | End: 2017-01-01 | Stop reason: HOSPADM

## 2017-01-01 RX ORDER — DEXTROSE MONOHYDRATE 50 MG/ML
100 INJECTION, SOLUTION INTRAVENOUS PRN
Status: DISCONTINUED | OUTPATIENT
Start: 2017-01-01 | End: 2017-01-01 | Stop reason: HOSPADM

## 2017-01-01 RX ORDER — GLIMEPIRIDE 4 MG/1
4 TABLET ORAL
Qty: 30 TABLET | Refills: 0 | DISCHARGE
Start: 2017-01-01

## 2017-01-01 RX ORDER — ISOSORBIDE DINITRATE 10 MG/1
10 TABLET ORAL 3 TIMES DAILY
Status: ON HOLD | COMMUNITY
End: 2017-01-01 | Stop reason: ALTCHOICE

## 2017-01-01 RX ORDER — ACETAMINOPHEN 325 MG/1
650 TABLET ORAL EVERY 4 HOURS PRN
Status: DISCONTINUED | OUTPATIENT
Start: 2017-01-01 | End: 2017-01-01

## 2017-01-01 RX ORDER — ISOSORBIDE DINITRATE 20 MG/1
20 TABLET ORAL 3 TIMES DAILY
Status: DISCONTINUED | OUTPATIENT
Start: 2017-01-01 | End: 2017-01-01 | Stop reason: HOSPADM

## 2017-01-01 RX ORDER — PANTOPRAZOLE SODIUM 40 MG/1
40 TABLET, DELAYED RELEASE ORAL
Status: DISCONTINUED | OUTPATIENT
Start: 2017-01-01 | End: 2017-01-01 | Stop reason: HOSPADM

## 2017-01-01 RX ORDER — ISOSORBIDE DINITRATE 10 MG/1
10 TABLET ORAL 3 TIMES DAILY
Qty: 270 TABLET | Refills: 3 | Status: SHIPPED | OUTPATIENT
Start: 2017-01-01 | End: 2017-01-01

## 2017-01-01 RX ORDER — WARFARIN SODIUM 2 MG/1
2 TABLET ORAL DAILY
COMMUNITY

## 2017-01-01 RX ORDER — OXYCODONE HYDROCHLORIDE 5 MG/1
2.5 TABLET ORAL EVERY 6 HOURS PRN
Qty: 3 TABLET | Refills: 0 | Status: SHIPPED | OUTPATIENT
Start: 2017-01-01 | End: 2017-11-19

## 2017-01-01 RX ORDER — ISOSORBIDE DINITRATE 10 MG/1
10 TABLET ORAL 3 TIMES DAILY
Qty: 270 TABLET | Refills: 3 | Status: ON HOLD | OUTPATIENT
Start: 2017-01-01 | End: 2017-01-01

## 2017-01-01 RX ORDER — ASPIRIN 325 MG
325 TABLET ORAL ONCE
Status: COMPLETED | OUTPATIENT
Start: 2017-01-01 | End: 2017-01-01

## 2017-01-01 RX ORDER — DIPHENHYDRAMINE HYDROCHLORIDE 50 MG/ML
50 INJECTION INTRAMUSCULAR; INTRAVENOUS ONCE
Status: DISCONTINUED | OUTPATIENT
Start: 2017-01-01 | End: 2017-01-01 | Stop reason: HOSPADM

## 2017-01-01 RX ORDER — CLOPIDOGREL BISULFATE 75 MG/1
75 TABLET ORAL DAILY
Status: DISCONTINUED | OUTPATIENT
Start: 2017-01-01 | End: 2017-01-01 | Stop reason: HOSPADM

## 2017-01-01 RX ORDER — ASPIRIN 325 MG
325 TABLET ORAL ONCE
Status: CANCELLED | OUTPATIENT
Start: 2017-01-01 | End: 2017-01-01

## 2017-01-01 RX ORDER — SODIUM CHLORIDE 9 MG/ML
INJECTION, SOLUTION INTRAVENOUS CONTINUOUS
Status: CANCELLED | OUTPATIENT
Start: 2017-01-01

## 2017-01-01 RX ORDER — HYDROCODONE BITARTRATE AND ACETAMINOPHEN 5; 325 MG/1; MG/1
1 TABLET ORAL EVERY 6 HOURS PRN
Qty: 15 TABLET | Refills: 0 | Status: SHIPPED | OUTPATIENT
Start: 2017-01-01 | End: 2017-01-01 | Stop reason: HOSPADM

## 2017-01-01 RX ORDER — ATORVASTATIN CALCIUM 20 MG/1
20 TABLET, FILM COATED ORAL NIGHTLY
Status: DISCONTINUED | OUTPATIENT
Start: 2017-01-01 | End: 2017-01-01 | Stop reason: HOSPADM

## 2017-01-01 RX ORDER — ATORVASTATIN CALCIUM 40 MG/1
40 TABLET, FILM COATED ORAL DAILY
Qty: 30 TABLET | Refills: 3 | Status: SHIPPED | OUTPATIENT
Start: 2017-01-01 | End: 2017-01-01

## 2017-01-01 RX ORDER — SODIUM CHLORIDE 9 MG/ML
INJECTION, SOLUTION INTRAVENOUS CONTINUOUS
Status: DISCONTINUED | OUTPATIENT
Start: 2017-01-01 | End: 2017-01-01

## 2017-01-01 RX ORDER — 0.9 % SODIUM CHLORIDE 0.9 %
1000 INTRAVENOUS SOLUTION INTRAVENOUS ONCE
Status: COMPLETED | OUTPATIENT
Start: 2017-01-01 | End: 2017-01-01

## 2017-01-01 RX ORDER — MAGNESIUM SULFATE HEPTAHYDRATE 500 MG/ML
1 INJECTION, SOLUTION INTRAMUSCULAR; INTRAVENOUS ONCE
Status: DISCONTINUED | OUTPATIENT
Start: 2017-01-01 | End: 2017-01-01

## 2017-01-01 RX ORDER — WARFARIN SODIUM 5 MG/1
5 TABLET ORAL ONCE
Status: COMPLETED | OUTPATIENT
Start: 2017-01-01 | End: 2017-01-01

## 2017-01-01 RX ORDER — GLIMEPIRIDE 1 MG/1
1 TABLET ORAL
Status: DISCONTINUED | OUTPATIENT
Start: 2017-01-01 | End: 2017-01-01

## 2017-01-01 RX ORDER — BUMETANIDE 0.25 MG/ML
1 INJECTION, SOLUTION INTRAMUSCULAR; INTRAVENOUS 2 TIMES DAILY
Status: DISCONTINUED | OUTPATIENT
Start: 2017-01-01 | End: 2017-01-01

## 2017-01-01 RX ORDER — ASPIRIN 81 MG/1
81 TABLET, CHEWABLE ORAL DAILY
Status: DISCONTINUED | OUTPATIENT
Start: 2017-01-01 | End: 2017-01-01 | Stop reason: HOSPADM

## 2017-01-01 RX ORDER — ACETAMINOPHEN 160 MG/5ML
650 SOLUTION ORAL EVERY 4 HOURS PRN
Status: DISCONTINUED | OUTPATIENT
Start: 2017-01-01 | End: 2017-01-01 | Stop reason: HOSPADM

## 2017-01-01 RX ORDER — CLOPIDOGREL 300 MG/1
600 TABLET, FILM COATED ORAL ONCE
Status: COMPLETED | OUTPATIENT
Start: 2017-01-01 | End: 2017-01-01

## 2017-01-01 RX ORDER — AMOXICILLIN 500 MG/1
500 CAPSULE ORAL 2 TIMES DAILY
Qty: 14 CAPSULE | Refills: 0 | Status: ON HOLD | OUTPATIENT
Start: 2017-01-01 | End: 2017-01-01 | Stop reason: HOSPADM

## 2017-01-01 RX ORDER — NITROGLYCERIN 0.4 MG/1
0.4 TABLET SUBLINGUAL EVERY 5 MIN PRN
Status: DISCONTINUED | OUTPATIENT
Start: 2017-01-01 | End: 2017-01-01 | Stop reason: HOSPADM

## 2017-01-01 RX ORDER — NITROGLYCERIN 0.4 MG/1
TABLET SUBLINGUAL
Qty: 25 TABLET | Refills: 3 | Status: SHIPPED | OUTPATIENT
Start: 2017-01-01

## 2017-01-01 RX ORDER — PANTOPRAZOLE SODIUM 40 MG/1
40 TABLET, DELAYED RELEASE ORAL
Status: DISCONTINUED | OUTPATIENT
Start: 2017-01-01 | End: 2017-01-01

## 2017-01-01 RX ORDER — WARFARIN SODIUM 2 MG/1
2 TABLET ORAL DAILY
Status: DISCONTINUED | OUTPATIENT
Start: 2017-01-01 | End: 2017-01-01 | Stop reason: SDUPTHER

## 2017-01-01 RX ORDER — BUMETANIDE 0.25 MG/ML
0.5 INJECTION, SOLUTION INTRAMUSCULAR; INTRAVENOUS ONCE
Status: COMPLETED | OUTPATIENT
Start: 2017-01-01 | End: 2017-01-01

## 2017-01-01 RX ORDER — ONDANSETRON 2 MG/ML
4 INJECTION INTRAMUSCULAR; INTRAVENOUS EVERY 6 HOURS PRN
Status: DISCONTINUED | OUTPATIENT
Start: 2017-01-01 | End: 2017-01-01 | Stop reason: HOSPADM

## 2017-01-01 RX ORDER — LABETALOL HYDROCHLORIDE 5 MG/ML
10 INJECTION, SOLUTION INTRAVENOUS PRN
Status: DISCONTINUED | OUTPATIENT
Start: 2017-01-01 | End: 2017-01-01 | Stop reason: HOSPADM

## 2017-01-01 RX ORDER — NICOTINE POLACRILEX 4 MG
15 LOZENGE BUCCAL PRN
Status: DISCONTINUED | OUTPATIENT
Start: 2017-01-01 | End: 2017-01-01 | Stop reason: HOSPADM

## 2017-01-01 RX ORDER — DEXTROSE AND SODIUM CHLORIDE 5; .9 G/100ML; G/100ML
INJECTION, SOLUTION INTRAVENOUS CONTINUOUS
Status: DISCONTINUED | OUTPATIENT
Start: 2017-01-01 | End: 2017-01-01

## 2017-01-01 RX ORDER — GLIMEPIRIDE 1 MG/1
1 TABLET ORAL
Status: ON HOLD | COMMUNITY
End: 2017-01-01 | Stop reason: HOSPADM

## 2017-01-01 RX ORDER — HYDRALAZINE HYDROCHLORIDE 20 MG/ML
10 INJECTION INTRAMUSCULAR; INTRAVENOUS EVERY 4 HOURS PRN
Status: DISCONTINUED | OUTPATIENT
Start: 2017-01-01 | End: 2017-01-01 | Stop reason: HOSPADM

## 2017-01-01 RX ORDER — DEXTROSE MONOHYDRATE 25 G/50ML
12.5 INJECTION, SOLUTION INTRAVENOUS PRN
Status: DISCONTINUED | OUTPATIENT
Start: 2017-01-01 | End: 2017-01-01 | Stop reason: HOSPADM

## 2017-01-01 RX ORDER — OXYCODONE HYDROCHLORIDE 5 MG/1
2.5 TABLET ORAL EVERY 6 HOURS PRN
Status: DISCONTINUED | OUTPATIENT
Start: 2017-01-01 | End: 2017-01-01 | Stop reason: HOSPADM

## 2017-01-01 RX ORDER — ISOSORBIDE DINITRATE 20 MG/1
20 TABLET ORAL 3 TIMES DAILY
COMMUNITY

## 2017-01-01 RX ORDER — SODIUM CHLORIDE 0.9 % (FLUSH) 0.9 %
10 SYRINGE (ML) INJECTION EVERY 12 HOURS SCHEDULED
Status: DISCONTINUED | OUTPATIENT
Start: 2017-01-01 | End: 2017-01-01 | Stop reason: SDUPTHER

## 2017-01-01 RX ORDER — GLIMEPIRIDE 4 MG/1
4 TABLET ORAL
Status: DISCONTINUED | OUTPATIENT
Start: 2017-01-01 | End: 2017-01-01 | Stop reason: HOSPADM

## 2017-01-01 RX ORDER — MORPHINE SULFATE 2 MG/ML
2 INJECTION, SOLUTION INTRAMUSCULAR; INTRAVENOUS ONCE
Status: COMPLETED | OUTPATIENT
Start: 2017-01-01 | End: 2017-01-01

## 2017-01-01 RX ORDER — SODIUM CHLORIDE 0.9 % (FLUSH) 0.9 %
10 SYRINGE (ML) INJECTION EVERY 12 HOURS SCHEDULED
Status: DISCONTINUED | OUTPATIENT
Start: 2017-01-01 | End: 2017-01-01 | Stop reason: HOSPADM

## 2017-01-01 RX ORDER — WARFARIN SODIUM 4 MG/1
4 TABLET ORAL
Status: COMPLETED | OUTPATIENT
Start: 2017-01-01 | End: 2017-01-01

## 2017-01-01 RX ORDER — ACETAMINOPHEN 325 MG/1
650 TABLET ORAL EVERY 4 HOURS PRN
Status: DISCONTINUED | OUTPATIENT
Start: 2017-01-01 | End: 2017-01-01 | Stop reason: HOSPADM

## 2017-01-01 RX ORDER — ASPIRIN 325 MG
325 TABLET ORAL ONCE
Status: DISCONTINUED | OUTPATIENT
Start: 2017-01-01 | End: 2017-01-01 | Stop reason: SDUPTHER

## 2017-01-01 RX ORDER — CEFDINIR 300 MG/1
300 CAPSULE ORAL 2 TIMES DAILY
Qty: 10 CAPSULE | Refills: 0 | DISCHARGE
Start: 2017-01-01 | End: 2017-11-17

## 2017-01-01 RX ORDER — SODIUM CHLORIDE 0.9 % (FLUSH) 0.9 %
10 SYRINGE (ML) INJECTION EVERY 12 HOURS SCHEDULED
Status: CANCELLED | OUTPATIENT
Start: 2017-01-01

## 2017-01-01 RX ORDER — ATROPINE SULFATE 0.4 MG/ML
AMPUL (ML) INJECTION
Status: DISPENSED
Start: 2017-01-01 | End: 2017-01-01

## 2017-01-01 RX ORDER — SODIUM CHLORIDE 0.9 % (FLUSH) 0.9 %
10 SYRINGE (ML) INJECTION PRN
Status: CANCELLED | OUTPATIENT
Start: 2017-01-01

## 2017-01-01 RX ORDER — ATORVASTATIN CALCIUM 20 MG/1
20 TABLET, FILM COATED ORAL DAILY
Qty: 30 TABLET | Refills: 5 | Status: SHIPPED | OUTPATIENT
Start: 2017-01-01

## 2017-01-01 RX ORDER — CLOPIDOGREL BISULFATE 75 MG/1
75 TABLET ORAL DAILY
Qty: 30 TABLET | Refills: 3 | Status: SHIPPED | OUTPATIENT
Start: 2017-01-01

## 2017-01-01 RX ORDER — WARFARIN SODIUM 2 MG/1
2 TABLET ORAL DAILY
Status: DISCONTINUED | OUTPATIENT
Start: 2017-01-01 | End: 2017-01-01 | Stop reason: DRUGHIGH

## 2017-01-01 RX ORDER — MORPHINE SULFATE 2 MG/ML
1 INJECTION, SOLUTION INTRAMUSCULAR; INTRAVENOUS ONCE
Status: COMPLETED | OUTPATIENT
Start: 2017-01-01 | End: 2017-01-01

## 2017-01-01 RX ORDER — ESOMEPRAZOLE MAGNESIUM 40 MG/1
40 CAPSULE, DELAYED RELEASE ORAL
Status: DISCONTINUED | OUTPATIENT
Start: 2017-01-01 | End: 2017-01-01 | Stop reason: HOSPADM

## 2017-01-01 RX ORDER — WARFARIN SODIUM 1 MG/1
1 TABLET ORAL ONCE
Status: COMPLETED | OUTPATIENT
Start: 2017-01-01 | End: 2017-01-01

## 2017-01-01 RX ORDER — GLIMEPIRIDE 1 MG/1
1 TABLET ORAL
Status: DISCONTINUED | OUTPATIENT
Start: 2017-01-01 | End: 2017-01-01 | Stop reason: HOSPADM

## 2017-01-01 RX ADMIN — ISOSORBIDE DINITRATE 20 MG: 20 TABLET ORAL at 13:43

## 2017-01-01 RX ADMIN — MORPHINE SULFATE 1 MG: 2 INJECTION, SOLUTION INTRAMUSCULAR; INTRAVENOUS at 18:10

## 2017-01-01 RX ADMIN — ATORVASTATIN CALCIUM 20 MG: 20 TABLET, FILM COATED ORAL at 21:44

## 2017-01-01 RX ADMIN — ASPIRIN 81 MG: 81 TABLET, CHEWABLE ORAL at 10:12

## 2017-01-01 RX ADMIN — SODIUM CHLORIDE: 9 INJECTION, SOLUTION INTRAVENOUS at 23:13

## 2017-01-01 RX ADMIN — MAGNESIUM SULFATE HEPTAHYDRATE 1 G: 500 INJECTION, SOLUTION INTRAMUSCULAR; INTRAVENOUS at 21:30

## 2017-01-01 RX ADMIN — ISOSORBIDE DINITRATE 20 MG: 20 TABLET ORAL at 21:19

## 2017-01-01 RX ADMIN — PANTOPRAZOLE SODIUM 40 MG: 40 TABLET, DELAYED RELEASE ORAL at 04:56

## 2017-01-01 RX ADMIN — ISOSORBIDE DINITRATE 20 MG: 20 TABLET ORAL at 21:40

## 2017-01-01 RX ADMIN — ISOSORBIDE DINITRATE 20 MG: 20 TABLET ORAL at 13:57

## 2017-01-01 RX ADMIN — PANTOPRAZOLE SODIUM 40 MG: 40 TABLET, DELAYED RELEASE ORAL at 09:40

## 2017-01-01 RX ADMIN — INSULIN LISPRO 9 UNITS: 100 INJECTION, SOLUTION INTRAVENOUS; SUBCUTANEOUS at 13:58

## 2017-01-01 RX ADMIN — ISOSORBIDE DINITRATE 20 MG: 20 TABLET ORAL at 15:06

## 2017-01-01 RX ADMIN — GLIMEPIRIDE 4 MG: 4 TABLET ORAL at 07:00

## 2017-01-01 RX ADMIN — OXYCODONE HYDROCHLORIDE 2.5 MG: 5 TABLET ORAL at 00:19

## 2017-01-01 RX ADMIN — Medication 10 ML: at 10:15

## 2017-01-01 RX ADMIN — LABETALOL HYDROCHLORIDE 10 MG: 5 INJECTION, SOLUTION INTRAVENOUS at 23:43

## 2017-01-01 RX ADMIN — SODIUM CHLORIDE: 9 INJECTION, SOLUTION INTRAVENOUS at 12:13

## 2017-01-01 RX ADMIN — ISOSORBIDE DINITRATE 20 MG: 20 TABLET ORAL at 20:35

## 2017-01-01 RX ADMIN — CLOPIDOGREL BISULFATE 600 MG: 300 TABLET, FILM COATED ORAL at 23:08

## 2017-01-01 RX ADMIN — WATER 1 G: 1 INJECTION INTRAMUSCULAR; INTRAVENOUS; SUBCUTANEOUS at 18:47

## 2017-01-01 RX ADMIN — ISOSORBIDE DINITRATE 20 MG: 20 TABLET ORAL at 04:52

## 2017-01-01 RX ADMIN — WARFARIN SODIUM 4 MG: 4 TABLET ORAL at 18:00

## 2017-01-01 RX ADMIN — Medication 4 UNITS: at 14:21

## 2017-01-01 RX ADMIN — ASPIRIN 81 MG: 81 TABLET, CHEWABLE ORAL at 09:53

## 2017-01-01 RX ADMIN — CLOPIDOGREL 75 MG: 75 TABLET, FILM COATED ORAL at 11:12

## 2017-01-01 RX ADMIN — INSULIN LISPRO 9 UNITS: 100 INJECTION, SOLUTION INTRAVENOUS; SUBCUTANEOUS at 18:19

## 2017-01-01 RX ADMIN — DIATRIZOATE MEGLUMINE AND DIATRIZOATE SODIUM 30 ML: 600; 100 SOLUTION ORAL; RECTAL at 11:50

## 2017-01-01 RX ADMIN — Medication 1 ML: at 14:50

## 2017-01-01 RX ADMIN — Medication 10 ML: at 20:35

## 2017-01-01 RX ADMIN — Medication 10 ML: at 09:41

## 2017-01-01 RX ADMIN — TETANUS TOXOID, REDUCED DIPHTHERIA TOXOID AND ACELLULAR PERTUSSIS VACCINE, ADSORBED 0.5 ML: 5; 2.5; 8; 8; 2.5 SUSPENSION INTRAMUSCULAR at 18:10

## 2017-01-01 RX ADMIN — EPINEPHRINE 1 MG: 0.1 INJECTION, SOLUTION ENDOTRACHEAL; INTRACARDIAC; INTRAVENOUS at 19:58

## 2017-01-01 RX ADMIN — CEFTRIAXONE 1 G: 1 INJECTION, POWDER, FOR SOLUTION INTRAMUSCULAR; INTRAVENOUS at 19:17

## 2017-01-01 RX ADMIN — WATER 1 G: 1 INJECTION INTRAMUSCULAR; INTRAVENOUS; SUBCUTANEOUS at 23:42

## 2017-01-01 RX ADMIN — ASPIRIN 325 MG: 325 TABLET, COATED ORAL at 23:08

## 2017-01-01 RX ADMIN — ISOSORBIDE DINITRATE 20 MG: 20 TABLET ORAL at 09:53

## 2017-01-01 RX ADMIN — SODIUM CHLORIDE: 9 INJECTION, SOLUTION INTRAVENOUS at 23:26

## 2017-01-01 RX ADMIN — OXYCODONE HYDROCHLORIDE 2.5 MG: 5 TABLET ORAL at 15:09

## 2017-01-01 RX ADMIN — PANTOPRAZOLE SODIUM 40 MG: 40 TABLET, DELAYED RELEASE ORAL at 07:00

## 2017-01-01 RX ADMIN — MORPHINE SULFATE 2 MG: 2 INJECTION, SOLUTION INTRAMUSCULAR; INTRAVENOUS at 16:37

## 2017-01-01 RX ADMIN — ATORVASTATIN CALCIUM 20 MG: 20 TABLET, FILM COATED ORAL at 20:54

## 2017-01-01 RX ADMIN — CLOPIDOGREL 75 MG: 75 TABLET, FILM COATED ORAL at 10:15

## 2017-01-01 RX ADMIN — DEXTROSE AND SODIUM CHLORIDE: 5; 900 INJECTION, SOLUTION INTRAVENOUS at 05:18

## 2017-01-01 RX ADMIN — ISOSORBIDE DINITRATE 20 MG: 20 TABLET ORAL at 21:44

## 2017-01-01 RX ADMIN — GLIMEPIRIDE 1 MG: 1 TABLET ORAL at 04:56

## 2017-01-01 RX ADMIN — ATORVASTATIN CALCIUM 20 MG: 20 TABLET, FILM COATED ORAL at 20:35

## 2017-01-01 RX ADMIN — ISOSORBIDE DINITRATE 20 MG: 20 TABLET ORAL at 09:40

## 2017-01-01 RX ADMIN — INSULIN LISPRO 1 UNITS: 100 INJECTION, SOLUTION INTRAVENOUS; SUBCUTANEOUS at 21:44

## 2017-01-01 RX ADMIN — WARFARIN SODIUM 1 MG: 1 TABLET ORAL at 17:33

## 2017-01-01 RX ADMIN — GLIMEPIRIDE 1 MG: 1 TABLET ORAL at 08:15

## 2017-01-01 RX ADMIN — Medication 10 UNITS: at 13:24

## 2017-01-01 RX ADMIN — CLOPIDOGREL 75 MG: 75 TABLET, FILM COATED ORAL at 09:40

## 2017-01-01 RX ADMIN — WATER 1 G: 1 INJECTION INTRAMUSCULAR; INTRAVENOUS; SUBCUTANEOUS at 00:00

## 2017-01-01 RX ADMIN — Medication 10 ML: at 21:43

## 2017-01-01 RX ADMIN — EPINEPHRINE 1 MG: 0.1 INJECTION, SOLUTION ENDOTRACHEAL; INTRACARDIAC; INTRAVENOUS at 20:01

## 2017-01-01 RX ADMIN — ISOSORBIDE DINITRATE 20 MG: 20 TABLET ORAL at 10:12

## 2017-01-01 RX ADMIN — OXYCODONE HYDROCHLORIDE 2.5 MG: 5 TABLET ORAL at 14:39

## 2017-01-01 RX ADMIN — INSULIN LISPRO 9 UNITS: 100 INJECTION, SOLUTION INTRAVENOUS; SUBCUTANEOUS at 13:44

## 2017-01-01 RX ADMIN — ASPIRIN 81 MG: 81 TABLET, CHEWABLE ORAL at 09:40

## 2017-01-01 RX ADMIN — ASPIRIN 81 MG: 81 TABLET, CHEWABLE ORAL at 10:22

## 2017-01-01 RX ADMIN — ISOSORBIDE DINITRATE 20 MG: 20 TABLET ORAL at 14:21

## 2017-01-01 RX ADMIN — ISOSORBIDE DINITRATE 20 MG: 20 TABLET ORAL at 20:54

## 2017-01-01 RX ADMIN — ISOSORBIDE DINITRATE 20 MG: 20 TABLET ORAL at 10:15

## 2017-01-01 RX ADMIN — Medication 4 UNITS: at 12:58

## 2017-01-01 RX ADMIN — WATER 1 G: 1 INJECTION INTRAMUSCULAR; INTRAVENOUS; SUBCUTANEOUS at 23:03

## 2017-01-01 RX ADMIN — INSULIN LISPRO 6 UNITS: 100 INJECTION, SOLUTION INTRAVENOUS; SUBCUTANEOUS at 16:10

## 2017-01-01 RX ADMIN — WARFARIN SODIUM 5 MG: 5 TABLET ORAL at 18:38

## 2017-01-01 RX ADMIN — METOPROLOL TARTRATE 25 MG: 25 TABLET ORAL at 21:30

## 2017-01-01 RX ADMIN — Medication 2 UNITS: at 17:41

## 2017-01-01 RX ADMIN — Medication 2 UNITS: at 10:26

## 2017-01-01 RX ADMIN — WARFARIN SODIUM 5 MG: 5 TABLET ORAL at 17:36

## 2017-01-01 RX ADMIN — ASPIRIN 81 MG: 81 TABLET, CHEWABLE ORAL at 11:12

## 2017-01-01 RX ADMIN — Medication 10 ML: at 13:26

## 2017-01-01 RX ADMIN — OXYCODONE HYDROCHLORIDE 2.5 MG: 5 TABLET ORAL at 13:42

## 2017-01-01 RX ADMIN — ISOSORBIDE DINITRATE 20 MG: 20 TABLET ORAL at 15:34

## 2017-01-01 RX ADMIN — ISOSORBIDE DINITRATE 20 MG: 20 TABLET ORAL at 11:12

## 2017-01-01 RX ADMIN — HYDRALAZINE HYDROCHLORIDE 10 MG: 20 INJECTION INTRAMUSCULAR; INTRAVENOUS at 06:54

## 2017-01-01 RX ADMIN — Medication 10 ML: at 21:20

## 2017-01-01 RX ADMIN — SODIUM CHLORIDE 1000 ML: 9 INJECTION, SOLUTION INTRAVENOUS at 16:37

## 2017-01-01 RX ADMIN — METOPROLOL TARTRATE 25 MG: 25 TABLET ORAL at 20:54

## 2017-01-01 RX ADMIN — CLOPIDOGREL 75 MG: 75 TABLET, FILM COATED ORAL at 10:11

## 2017-01-01 RX ADMIN — CLOPIDOGREL 75 MG: 75 TABLET, FILM COATED ORAL at 10:13

## 2017-01-01 RX ADMIN — PANTOPRAZOLE SODIUM 40 MG: 40 TABLET, DELAYED RELEASE ORAL at 08:16

## 2017-01-01 RX ADMIN — INSULIN LISPRO 3 UNITS: 100 INJECTION, SOLUTION INTRAVENOUS; SUBCUTANEOUS at 18:00

## 2017-01-01 RX ADMIN — Medication 10 ML: at 21:41

## 2017-01-01 RX ADMIN — ATORVASTATIN CALCIUM 20 MG: 20 TABLET, FILM COATED ORAL at 21:19

## 2017-01-01 RX ADMIN — ATORVASTATIN CALCIUM 20 MG: 20 TABLET, FILM COATED ORAL at 21:40

## 2017-01-01 RX ADMIN — ISOSORBIDE DINITRATE 20 MG: 20 TABLET ORAL at 14:39

## 2017-01-01 RX ADMIN — HYDRALAZINE HYDROCHLORIDE 10 MG: 20 INJECTION INTRAMUSCULAR; INTRAVENOUS at 22:37

## 2017-01-01 RX ADMIN — METOPROLOL TARTRATE 25 MG: 25 TABLET ORAL at 09:40

## 2017-01-01 RX ADMIN — GLIMEPIRIDE 4 MG: 4 TABLET ORAL at 06:45

## 2017-01-01 RX ADMIN — BUMETANIDE 1 MG: 0.25 INJECTION, SOLUTION INTRAMUSCULAR; INTRAVENOUS at 18:00

## 2017-01-01 RX ADMIN — CLOPIDOGREL 75 MG: 75 TABLET, FILM COATED ORAL at 09:53

## 2017-01-01 RX ADMIN — ONDANSETRON 4 MG: 2 INJECTION INTRAMUSCULAR; INTRAVENOUS at 07:50

## 2017-01-01 RX ADMIN — BUMETANIDE 0.5 MG: 0.25 INJECTION, SOLUTION INTRAMUSCULAR; INTRAVENOUS at 10:54

## 2017-01-01 RX ADMIN — PANTOPRAZOLE SODIUM 40 MG: 40 TABLET, DELAYED RELEASE ORAL at 06:45

## 2017-01-01 RX ADMIN — ASPIRIN 81 MG: 81 TABLET, CHEWABLE ORAL at 10:15

## 2017-01-01 RX ADMIN — SODIUM CHLORIDE: 9 INJECTION, SOLUTION INTRAVENOUS at 14:21

## 2017-01-01 RX ADMIN — OXYCODONE HYDROCHLORIDE 2.5 MG: 5 TABLET ORAL at 16:09

## 2017-01-01 ASSESSMENT — PAIN DESCRIPTION - PROGRESSION
CLINICAL_PROGRESSION: NOT CHANGED
CLINICAL_PROGRESSION: GRADUALLY WORSENING
CLINICAL_PROGRESSION: NOT CHANGED

## 2017-01-01 ASSESSMENT — ENCOUNTER SYMPTOMS
BACK PAIN: 0
APNEA: 0
ABDOMINAL DISTENTION: 0
SHORTNESS OF BREATH: 1
EYE ITCHING: 0
ABDOMINAL PAIN: 0
DIARRHEA: 0
COUGH: 0
BLOOD IN STOOL: 0
APNEA: 0
CONSTIPATION: 0
EYE REDNESS: 0
VOMITING: 0
COLOR CHANGE: 0
SINUS PRESSURE: 0
VOMITING: 0
NAUSEA: 0
STRIDOR: 0
SHORTNESS OF BREATH: 0
ABDOMINAL PAIN: 0
EYE REDNESS: 0
RHINORRHEA: 0
DIARRHEA: 0
SORE THROAT: 0
COUGH: 0
EYE PAIN: 0
ABDOMINAL PAIN: 0
EYE PAIN: 0
WHEEZING: 0
NAUSEA: 0
ABDOMINAL PAIN: 0
SHORTNESS OF BREATH: 0
EYE DISCHARGE: 0
SINUS PRESSURE: 0
BACK PAIN: 0
NAUSEA: 0
EYE DISCHARGE: 0
CHEST TIGHTNESS: 0
ABDOMINAL PAIN: 0
NAUSEA: 0
DIARRHEA: 0
SHORTNESS OF BREATH: 0
SHORTNESS OF BREATH: 1
CHEST TIGHTNESS: 0
BACK PAIN: 1
BACK PAIN: 0
EYE REDNESS: 0
BLOOD IN STOOL: 0
DIARRHEA: 0
SORE THROAT: 0
COUGH: 0
NAUSEA: 0
ABDOMINAL DISTENTION: 0
SORE THROAT: 0
EYE ITCHING: 0
CHEST TIGHTNESS: 0
BACK PAIN: 0
VOMITING: 0
EYE DISCHARGE: 0
WHEEZING: 0
CONSTIPATION: 0
BLOOD IN STOOL: 0

## 2017-01-01 ASSESSMENT — PAIN DESCRIPTION - PAIN TYPE
TYPE: ACUTE PAIN

## 2017-01-01 ASSESSMENT — PAIN SCALES - GENERAL
PAINLEVEL_OUTOF10: 8
PAINLEVEL_OUTOF10: 9
PAINLEVEL_OUTOF10: 8
PAINLEVEL_OUTOF10: 0
PAINLEVEL_OUTOF10: 5
PAINLEVEL_OUTOF10: 0
PAINLEVEL_OUTOF10: 0
PAINLEVEL_OUTOF10: 7
PAINLEVEL_OUTOF10: 0
PAINLEVEL_OUTOF10: 8
PAINLEVEL_OUTOF10: 0
PAINLEVEL_OUTOF10: 3
PAINLEVEL_OUTOF10: 5
PAINLEVEL_OUTOF10: 0
PAINLEVEL_OUTOF10: 6
PAINLEVEL_OUTOF10: 4
PAINLEVEL_OUTOF10: 3
PAINLEVEL_OUTOF10: 10
PAINLEVEL_OUTOF10: 5
PAINLEVEL_OUTOF10: 0
PAINLEVEL_OUTOF10: 2
PAINLEVEL_OUTOF10: 3
PAINLEVEL_OUTOF10: 3
PAINLEVEL_OUTOF10: 0
PAINLEVEL_OUTOF10: 6
PAINLEVEL_OUTOF10: 6
PAINLEVEL_OUTOF10: 0
PAINLEVEL_OUTOF10: 6
PAINLEVEL_OUTOF10: 0
PAINLEVEL_OUTOF10: 4

## 2017-01-01 ASSESSMENT — PAIN SCALES - WONG BAKER

## 2017-01-01 ASSESSMENT — PAIN DESCRIPTION - ORIENTATION
ORIENTATION: LOWER
ORIENTATION: RIGHT
ORIENTATION: LOWER
ORIENTATION: LEFT
ORIENTATION: LEFT
ORIENTATION: LEFT;LOWER

## 2017-01-01 ASSESSMENT — PAIN DESCRIPTION - LOCATION
LOCATION: BACK
LOCATION: BACK
LOCATION: ANKLE;KNEE
LOCATION: ARM
LOCATION: BACK

## 2017-01-01 ASSESSMENT — PAIN DESCRIPTION - DESCRIPTORS
DESCRIPTORS: THROBBING
DESCRIPTORS: CONSTANT;TENDER;SHARP
DESCRIPTORS: CONSTANT;CRAMPING

## 2017-01-01 ASSESSMENT — PAIN DESCRIPTION - FREQUENCY
FREQUENCY: CONTINUOUS

## 2017-01-01 ASSESSMENT — PAIN DESCRIPTION - ONSET: ONSET: GRADUAL

## 2017-01-01 ASSESSMENT — PAIN - FUNCTIONAL ASSESSMENT
PAIN_FUNCTIONAL_ASSESSMENT: 0-10
PAIN_FUNCTIONAL_ASSESSMENT: 0-10

## 2017-01-10 PROBLEM — E86.0 DEHYDRATION WITH HYPONATREMIA: Status: ACTIVE | Noted: 2017-01-01

## 2017-01-10 PROBLEM — E87.1 DEHYDRATION WITH HYPONATREMIA: Status: ACTIVE | Noted: 2017-01-01

## 2017-01-12 PROBLEM — B95.2 UTI (URINARY TRACT INFECTION) DUE TO ENTEROCOCCUS: Status: ACTIVE | Noted: 2017-01-01

## 2017-01-12 PROBLEM — R13.13 PHARYNGEAL DYSPHAGIA: Status: ACTIVE | Noted: 2017-01-01

## 2017-01-12 PROBLEM — N39.0 UTI (URINARY TRACT INFECTION) DUE TO ENTEROCOCCUS: Status: ACTIVE | Noted: 2017-01-01

## 2017-10-10 NOTE — PROGRESS NOTES
Coumadin    Debility    Chronic obstructive pulmonary disease (HCC)    Type 2 diabetes mellitus with complication, with long-term current use of insulin (HCC)    Pneumonia of right upper lobe due to infectious organism    Pacemaker    Essential hypertension    Esophageal stricture    Gastrojejunostomy tube status    Troponin level elevated    Coronary artery disease involving coronary bypass graft of native heart without angina pectoris    Coronary artery disease involving coronary bypass graft of native heart    Dehydration with hyponatremia    Pharyngeal dysphagia    UTI (urinary tract infection) due to Enterococcus    Supratherapeutic INR    PEG (percutaneous endoscopic gastrostomy) status (HCC)    Abnormal BUN-to-creatinine ratio    Elevated brain natriuretic peptide (BNP) level    Osteoarthritis of right knee    S/P cardiac cath - LHC and RHC and pci of the SVG to OM1    S/P angioplasty with stent       Past Surgical History:   Procedure Laterality Date    CARDIAC SURGERY      CHOLECYSTECTOMY      COLONOSCOPY      CORONARY ARTERY BYPASS GRAFT  1990    4 vessel [place]    ESOPHAGEAL DILATATION  8/24/2007    Dr Caitie Davis Right 1960    leg    GASTROSTOMY TUBE PLACEMENT      GASTROSTOMY TUBE PLACEMENT N/A 7/17/2017    PEG BUTTON CHANGE WITH NURSING STAFF performed by Toy Cooper MD at [facility]   [address]      x[address]   [address] [facility] dual pacemaker    UPPER GASTROINTESTINAL ENDOSCOPY  8/24/2007    Dr Nickie Dawkins       No Known Allergies     Family History   Problem Relation Age of Onset    Diabetes Mother     High Blood Pressure Mother     Stroke Mother      cerebral hemorrhage    Heart Disease Father     Cancer Neg Hx         Social History     Social History    Marital status:      Spouse name: Dayana Hutchison    Number of children: 2    Years of education: 12     Occupational History    Not on file.      Social History Main Topics    Smoking status: Former Smoker     Types: Cigarettes     Quit date: 1/1/1950    Smokeless tobacco: Never Used    Alcohol use No    Drug use: No    Sexual activity: Not Currently     Other Topics Concern    Not on file     Social History Narrative    No narrative on file       Current Outpatient Prescriptions   Medication Sig Dispense Refill    atorvastatin (LIPITOR) 20 MG tablet Take 1 tablet by mouth daily 30 tablet 5    metFORMIN (GLUCOPHAGE) 500 MG tablet Take 500 mg by mouth 2 times daily (with meals)      isosorbide dinitrate (ISORDIL) 20 MG tablet Take 20 mg by mouth 3 times daily      clopidogrel (PLAVIX) 75 MG tablet Take 1 tablet by mouth daily 30 tablet 3    nitroGLYCERIN (NITROSTAT) 0.4 MG SL tablet up to max of 3 total doses. If no relief after 1 dose, call 911. 25 tablet 3    warfarin (COUMADIN) 2 MG tablet 2 mg by PEG Tube route daily       glimepiride (AMARYL) 1 MG tablet 1 mg by PEG Tube route every morning (before breakfast)       acetaminophen (TYLENOL) 160 MG/5ML solution Take 20.3 mLs by mouth 2 times daily 473 mL 3    Acetaminophen 650 MG TABS 650 mg by PEG Tube route every 4 hours as needed  120 tablet 3    aspirin 81 MG tablet 81 mg by PEG Tube route daily      esomeprazole (NEXIUM) 40 MG capsule 40 mg by PEG Tube route every morning (before breakfast). No current facility-administered medications for this visit. Facility-Administered Medications Ordered in Other Visits   Medication Dose Route Frequency Provider Last Rate Last Dose    diatrizoate meglumine-sodium (GASTROGRAFIN) 66-10 % solution 30 mL  30 mL Oral ONCE PRN Diane Mendoza MD   30 mL at 07/17/17 1150       Review of Systems -     General ROS: negative  Psychological ROS: negative  Hematological and Lymphatic ROS: No history of blood clots or bleeding disorder.    Respiratory ROS: no cough, shortness of breath, or wheezing  Cardiovascular ROS: no chest pain or dyspnea on exertion  Gastrointestinal ROS: negative  Genito-Urinary ROS: no dysuria, trouble voiding, or hematuria  Musculoskeletal ROS: negative  Neurological ROS: no TIA or stroke symptoms  Dermatological ROS: negative      Blood pressure 122/60, pulse 60, height 5' 10\" (1.778 m), weight 124 lb (56.2 kg). Physical Examination:    General appearance - alert, well appearing, and in no distress  Mental status - alert, oriented to person, place, and time  Neck - supple, no significant adenopathy, no JVD, or carotid bruits  Chest - clear to auscultation, no wheezes, rales or rhonchi, symmetric air entry  Heart - normal rate, regular rhythm, normal S1, S2, no murmurs, rubs, clicks or gallops  Abdomen - soft, nontender, nondistended, no masses or organomegaly  Neurological - alert, oriented, normal speech, no focal findings or movement disorder noted  Musculoskeletal - no joint tenderness, deformity or swelling  Extremities - peripheral pulses normal, no pedal edema, no clubbing or cyanosis  Skin - normal coloration and turgor, no rashes, no suspicious skin lesions noted    Lab  No results for input(s): CKTOTAL, CKMB, CKMBINDEX, TROPONINI in the last 72 hours.   CBC:   Lab Results   Component Value Date    WBC 5.5 09/20/2017    RBC 3.62 09/20/2017    HGB 10.0 09/20/2017    HCT 30.7 09/20/2017    MCV 84.7 09/20/2017    MCH 27.6 09/20/2017    MCHC 32.6 09/20/2017    RDW 15.5 09/20/2017     09/20/2017    MPV 11.1 09/20/2017     BMP:    Lab Results   Component Value Date     09/20/2017    K 5.3 09/20/2017    CL 99 09/20/2017    CO2 24 09/20/2017    BUN 25 09/20/2017    LABALBU 3.6 05/20/2017    CREATININE 0.7 09/20/2017    CALCIUM 9.9 09/20/2017    LABGLOM >90 09/20/2017    GLUCOSE 140 09/20/2017    GLUCOSE 144 05/20/2015     Hepatic Function Panel:    Lab Results   Component Value Date    ALKPHOS 88 05/20/2017    ALT 11 05/20/2017    AST 19 05/20/2017    PROT 7.3 05/20/2017    BILITOT 1.0 05/20/2017    BILIDIR 0.3 05/20/2017    LABALBU 3.6 05/20/2017     Magnesium:    Lab Results   Component Value Date    MG 1.8 10/18/2016     Warfarin PT/INR:    No components found for: Papa Ahumada  HgBA1c:    Lab Results   Component Value Date    LABA1C 7.5 06/05/2017     FLP:    Lab Results   Component Value Date    TRIG 54 08/15/2014    HDL 55 08/15/2014    LDLCALC 65 08/15/2014     TSH:    Lab Results   Component Value Date    TSH 3.130 01/10/2017     severe Triple Vessel CAD  LIMA TO LAD -PATENT  SVG TO OM1 OSTIAL 99% STENOSIS and severe ds in distal anastomosis site of 95 %mainly post anastomosis. SVG TO OM2 PATENT  SVG TO DIAGONAL PATENT  RHC  PA 61/21 MEAN 39 MMHG - MODERATE PULM HTN  RV 61/5  RA 13/13 MEAN 9  SATURATION  PA 57%  RV 55%  RA 54 %  AO 98 %  PCI OF THE SVG TO OM1 at proximal and distal anastomosis site done AND DOWN THE LINE TAVR FOR  SEVERE AORTIC STENOSIS  Recommendations  PCI OF THE SVG TO OM1 at proximal and distal anastmosis site AND DOWN THE LINE TAVR FOR SEVERE  AORTIC STENOSIS  Estimated Blood Loss: 10 ml. Complications: No complications. Signatures  Electronically signed by Justice Gage MD (Performing Physician) on 09/21/2017 at 17:17      EKG 10/10/17  Paced rhythm    Assessment    1. S/P angioplasty with stent  EKG 12 Lead   2. Coronary artery disease involving coronary bypass graft of native heart without angina pectoris  EKG 12 Lead   3. Severe aortic stenosis     4. Hx of CABG,1990     5. Pure hypercholesterolemia     6. Essential hypertension     7. Paroxysmal atrial flutter (HCC)     8. S/P cardiac cath - LHC and RHC and pci of the SVG to OM1     9.  [employer] dual pacer 2014 Dox          Previous admission DX  Acute on chronic diastolic CHF  Mod-severe AS - Valve area estimated: 0.9 cm2  Hyponatremia  Anemia - s/p 1 u PRBC  -ef 55, grade 2 diastolic dysfunction per ECHO 8/15/2014  -pro-BNP 91286.0  -CXR showing likely pulmonary edema and CHF  -given lasix 20 mg IV x 1   Elevated troponin

## 2017-10-13 NOTE — TELEPHONE ENCOUNTER
Son had called the [facility] requesting information regarding the TAVR procedure. He would like info regarding costs and insurance. Attempted to call son and spoke with the patient, message and office number left.

## 2017-11-03 NOTE — ED NOTES
Patient resting in bed with eyes closed. Respirations easy, unlabored. No distress noted. Call light in reach, side rails up x2. Family remains at bedside.      Sully Moon RN  11/03/17 0954

## 2017-11-03 NOTE — ED NOTES
Patient incontinent in depends. Able to urinate some in urinal. Breana care provided, depends changed, repositioned in bed.       Lon Stahl, RN  11/03/17 1925

## 2017-11-03 NOTE — ED TRIAGE NOTES
Patient to ED room 22 by [facility]. Patient was trying to sit in a chair last evening, chair slipped out from under him and he fell. Currently complains of pain in right ankle and right knee. Abrasion noted to left elbow. Patient reports he hit the back of his head. Is on coumadin.

## 2017-11-03 NOTE — ED NOTES
Bed: 022A  Expected date: 11/3/17  Expected time:   Means of arrival: [facility]  Comments:     Rosie Cobb RN  11/03/17 [id number]

## 2017-11-03 NOTE — ED PROVIDER NOTES
swelling. Tenderness found. Right ankle: Tenderness. Left ankle: Normal.        Cervical back: Normal. He exhibits normal range of motion and no tenderness. Right foot: Normal.        Left foot: There is laceration (small abrasion to the heal with active controlled bleeding). Neurological: He is alert and oriented to person, place, and time. No cranial nerve deficit or sensory deficit. He exhibits normal muscle tone. GCS eye subscore is 4. GCS verbal subscore is 5. GCS motor subscore is 6. The patient is neurologically intact    Skin: Skin is warm and dry. He is not diaphoretic. No erythema. Psychiatric: He has a normal mood and affect. His behavior is normal.   Nursing note and vitals reviewed. DIAGNOSTIC RESULTS     EKG: All EKG's are interpreted by the Emergency Department Physician who either signs or Co-signs this chart in the absence of a cardiologist.  EKG interpreted by Ban Dillon MD:    [place]. Rate: 60 bpm  LA interval: * ms  QRS duration: 162 ms  QTc: 476 ms  P-R-T axes: *, -79, 91  Ventricular-paced rhythm  No STEMI  Compared to old EKG on 09-      RADIOLOGY: non-plain film images(s) such as CT, Ultrasound and MRI are read by the radiologist.    XR TIBIA FIBULA RIGHT (2 VIEWS)   Final Result    No acute fracture. **This report has been created using voice recognition software. It may contain minor errors which are inherent in voice recognition technology. **      Final report electronically signed by Dr. Loren House on 11/3/2017 12:57 PM      XR ANKLE RIGHT (2 VIEWS)   Final Result      No acute findings. **This report has been created using voice recognition software. It may contain minor errors which are inherent in voice recognition technology. **      Final report electronically signed by Dr. Loren House on 11/3/2017 12:49 PM      XR HIP RIGHT (2-3 VIEWS)   Final Result   1. No fracture.    2. Moderate degenerative changes in the right hip joint. **This report has been created using voice recognition software. It may contain minor errors which are inherent in voice recognition technology. **      Final report electronically signed by Dr. Janice Ballesteros on 11/3/2017 12:39 PM      CT Cervical Spine WO Contrast   Final Result    No fracture. **This report has been created using voice recognition software. It may contain minor errors which are inherent in voice recognition technology. **      Final report electronically signed by Dr. Janice Ballesteros on 11/3/2017 12:37 PM      CT HEAD WO CONTRAST   Final Result    No evidence of an acute process. No change from prior. **This report has been created using voice recognition software. It may contain minor errors which are inherent in voice recognition technology. **      Final report electronically signed by Dr. Janice Ballesteros on 11/3/2017 12:33 PM          LABS:   Labs Reviewed   CBC WITH AUTO DIFFERENTIAL - Abnormal; Notable for the following:        Result Value    RBC 3.58 (*)     Hemoglobin 9.6 (*)     Hematocrit 29.3 (*)     MCH 26.9 (*)     MCHC 32.8 (*)     RDW 17.5 (*)     MPV 10.6 (*)     All other components within normal limits   PROTIME-INR - Abnormal; Notable for the following:     INR 2.09 (*)     All other components within normal limits   COMPREHENSIVE METABOLIC PANEL - Abnormal; Notable for the following:     Glucose 231 (*)     BUN 27 (*)     Alb 3.2 (*)     All other components within normal limits   TROPONIN - Abnormal; Notable for the following:     Troponin T 0.060 (*)     All other components within normal limits   ANION GAP   OSMOLALITY   GLOMERULAR FILTRATION RATE, ESTIMATED   URINE RT REFLEX TO CULTURE       EMERGENCY DEPARTMENT COURSE:   Vitals:    Vitals:    11/03/17 1055 11/03/17 1238 11/03/17 1302   BP: (!) 152/67 126/65 (!) 146/69   Pulse: 59     Resp: 18     Temp: 98.2 °F (36.8 °C)     TempSrc: Oral     SpO2: 99% 100% 100%   Weight: 135 lb (61.2 kg)     Height: 5' 10\" (1.778 m)         12:13 PM: The patient was seen and evaluated. Patient came to the emergency department after a fall and head injury    MDM:  Patient was complaining of right ankle pain. X-ray did not show any acute fracture but did show old healed fracture. Patient's other radiological examination was also normal.  Patient's INR was in the lower side of the therapeutic range. Patient's CT scan of the head and neck was also normal.  All the findings were reviewed and discussed with the patient and the son in the room. Patient's son was told about concussion precautions in detail and was advised to bring the patient back to the emergency department if he starts having nausea vomiting increased headache confusion or strokelike symptoms. CRITICAL CARE:   None    CONSULTS:  None    PROCEDURES:  None     FINAL IMPRESSION      1. Injury of head, initial encounter    2. Multiple contusions          DISPOSITION/PLAN   Discharge    PATIENT REFERRED TO:  Phylicia Mckenzie MD  [address] [facility]  [phone]    Go in 1 day      Phylicia Mckenzie MD  [address] [place] [address]  [phone]            DISCHARGE MEDICATIONS:  New Prescriptions    DICLOFENAC (SOLARAZE) 3 % GEL    Apply topically 2 times daily. (Please note that portions of this note were completed with a voice recognition program.  Efforts were made to edit the dictations but occasionally words are mis-transcribed.)    Scribe:  Kym Goff11/3/17 12:13 PM Scribing for and in the presence of Milady Hayden MD.    Signed by: [facility], [address], 11/03/17 1:59 PM    Provider:  I personally performed the services described in the documentation, reviewed and edited the documentation which was dictated to the scribe in my presence, and it accurately records my words and actions.     Milady Hayden MD 11/3/17 1:59 PM                          [facility] Pierce Garcia MD  11/03/17 7899

## 2017-11-07 PROBLEM — E86.0 DEHYDRATION, MODERATE: Status: ACTIVE | Noted: 2017-01-01

## 2017-11-07 PROBLEM — N39.0 URINARY TRACT INFECTION: Status: ACTIVE | Noted: 2017-01-01

## 2017-11-07 PROBLEM — N12 PYELONEPHRITIS: Status: ACTIVE | Noted: 2017-01-01

## 2017-11-07 NOTE — ED NOTES
Bed: 009A  Expected date: 11/7/17  Expected time: 3:36 PM  Means of arrival: [facility]  Comments:

## 2017-11-07 NOTE — ED NOTES
Patient resting in bed call light in reach states no needs at this time no acute distress noted respirations easy and unlabored at this time        Adams Laureano RN  11/07/17 0143

## 2017-11-08 NOTE — H&P
History & Physical        Patient:  Bety Rico  YOB: 1927    MRN: [id number]     Acct: [de-identified]    PCP: Keyla Betancourt MD    Date of Admission: 11/7/2017    Date of Service: Pt seen/examined on 11/7/17 and Admitted to Inpatient with expected LOS greater than two midnights due to medical therapy. Chief Complaint:  Dysuria, left flank pain, poor appetite. History Of Present Illness:      80 y.o. male who, as seen below, has a complex past medical history. 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 Patient was recently diagnosed (or suspected) with a urine infection four days ago, but was not provided antibiotic therapy. Amoxicillin was started yesterday. Meanwhile, a urine culture was performed, and today the results show the presence of strep agalactiae. Patient came to the emergency department today because he has not been taking much food or fluids at home and, a progressive left flank pain has developed. His urine analysis in the emergency department today shows positive nitrites, large leukocyte esterase, clumps of WBCs, hyaline casts, and oxalate crystals. His other labs show a chronic anemia with hematocrit of 30. White cell count is not elevated at 7000. He does have some hyperkalemia at 5.6. BUN is 35 and creatinine 0.6. Patient was initiated on Rocephin IV in the emergency department and referred for admission for likely pyelonephritis.         Past Medical History:          Diagnosis Date    Arthritis     Barretts esophagus     CAD (coronary artery disease)     Status post CABG in 1990, Status post stent    COPD [facility]ch[facility] obstructive pulmonary [facility])     [facility] 43/82/4813    GERD (gastroesophageal reflux disease)     History of pneumonia     Hyperlipidemia     Hypertension     Type II or unspecified type diabetes mellitus without mention of complication, not stated as uncontrolled     diagnosed 11/2012    UTI (urinary tract infection) due to Enterococcus 1/12/2017       Past Surgical History:          Procedure Laterality Date    CARDIAC SURGERY      CHOLECYSTECTOMY      COLONOSCOPY      CORONARY ARTERY BYPASS GRAFT  1990    4 vessel [place]    ESOPHAGEAL DILATATION  8/24/2007    Dr Sky Noyola Right 1960    leg    GASTROSTOMY TUBE PLACEMENT      GASTROSTOMY TUBE PLACEMENT N/A 7/17/2017    PEG BUTTON CHANGE WITH NURSING STAFF performed by Juliano Palma MD at [place] [facility]      x3   [address] [facility] dual pacemaker    UPPER GASTROINTESTINAL ENDOSCOPY  8/24/2007    Dr Bette Marks       Medications Prior to Admission:      Prior to Admission medications    Medication Sig Start Date End Date Taking? Authorizing Provider   HYDROcodone-acetaminophen (NORCO) 5-325 MG per tablet Take 1 tablet by mouth every 6 hours as needed for Pain . 11/7/17 11/14/17 Yes Benito Gum, DO   amoxicillin (AMOXIL) 500 MG capsule Take 1 capsule by mouth 2 times daily for 7 days 11/5/17 11/12/17  Kenia Caldwell PA-C   diclofenac [facility]) 3 % gel Apply topically 2 times daily. 11/3/17   Morro Mays MD   atorvastatin (LIPITOR) 20 MG tablet Take 1 tablet by mouth daily 10/10/17   Marion Guzman MD   metFORMIN (GLUCOPHAGE) 500 MG tablet Take 500 mg by mouth 2 times daily (with meals)    Historical Provider, MD   isosorbide dinitrate (ISORDIL) 20 MG tablet Take 20 mg by mouth 3 times daily    Historical Provider, MD   clopidogrel (PLAVIX) 75 MG tablet Take 1 tablet by mouth daily 9/21/17   Julia Woods PA-C   nitroGLYCERIN (NITROSTAT) 0.4 MG SL tablet up to max of 3 total doses.  If no relief after 1 dose, call 911. 9/21/17   Lori Mckenzie PA-C   warfarin (COUMADIN) 2 MG tablet 2 mg by PEG Tube route daily     Historical Provider, MD   glimepiride (AMARYL) 1 MG tablet 1 mg by PEG Tube route every morning (before breakfast)     Historical Provider, MD   acetaminophen (TYLENOL) 160 MG/5ML solution Take 20.3 mLs by mouth 2 times daily 6/6/17   Marky Echevarria MD   Acetaminophen 650 MG TABS 650 mg by PEG Tube route every 4 hours as needed  3/15/16   Andreia Spaulding MD   aspirin 81 MG tablet 81 mg by PEG Tube route daily    Historical Provider, MD   esomeprazole (NEXIUM) 40 MG capsule 40 mg by PEG Tube route every morning (before breakfast). Historical Provider, MD       Allergies:  Review of patient's allergies indicates no known allergies. Social History:      The patient currently lives at home. TOBACCO:   reports that he quit smoking about 67 years ago. His smoking use included Cigarettes. He has never used smokeless tobacco.  ETOH:   reports that he does not drink alcohol. Family History:      Positive as follows:        Problem Relation Age of Onset    Diabetes Mother     High Blood Pressure Mother     Stroke Mother      cerebral hemorrhage    Heart Disease Father     Cancer Neg Hx        Diet:  Diet NPO Effective Now    REVIEW OF SYSTEMS:   Pertinent positives as noted in the HPI. All other systems reviewed and negative. PHYSICAL EXAM:    BP (!) 185/73   Pulse 59   Temp 97.9 °F (36.6 °C) (Oral)   Resp 20   Ht 5' 9\" (1.753 m)   Wt 124 lb 9.6 oz (56.5 kg) Comment: 3 pillows, bedsheet/blanket, pt, heart monitor, call light  SpO2 96%   BMI 18.40 kg/m²     General appearance: Thin gentleman, perhaps undernourished. No apparent distress, appears stated age and cooperative. HEENT:  Normal cephalic, atraumatic without obvious deformity. Pupils equal, round, and reactive to light. Extra ocular muscles intact. Conjunctivae/corneas clear. Neck: Supple, with full range of motion. No jugular venous distention. Trachea midline. Respiratory:  Normal respiratory effort. Clear to auscultation, bilaterally without Rales/Wheezes/Rhonchi.   Cardiovascular:  Regular rate and rhythm with normal S1/S2 without murmurs, rubs or gallops. Abdomen: Soft, non-tender, non-distended with normal bowel sounds. Left upper abdominal quadrant PEG site is intact. Musculoskeletal:  No clubbing, cyanosis or edema bilaterally. Fair range of motion without deformity. Skin: Skin color, texture, turgor normal.  No rashes or lesions. Neurologic:  Neurovascularly intact without any focal sensory/motor deficits. Cranial nerves: II-XII intact, grossly non-focal.  Psychiatric:  Alert and oriented, somewhat slow to respond, but thought content appropriate, normal insight  Capillary Refill: Brisk,< 3 seconds   Peripheral Pulses: +2 palpable, equal bilaterally       Labs:     Recent Labs      11/07/17   1612  11/08/17   0607   WBC  7.7  6.7   HGB  9.9*  8.6*   HCT  29.8*  26.1*   PLT  159  134     Recent Labs      11/07/17   1612  11/08/17   0607   NA  137  136   K  5.6*  4.5   CL  102  103   CO2  24  22*   BUN  35*  27*   CREATININE  0.6  0.5   CALCIUM  9.8  9.0     No results for input(s): AST, ALT, BILIDIR, BILITOT, ALKPHOS in the last 72 hours. Recent Labs      11/08/17   0607   INR  3.62*     No results for input(s): Kolleen Mazzoni in the last 72 hours. Urinalysis:      Lab Results   Component Value Date    NITRU POSITIVE 11/07/2017    WBCUA 75-100W/CLUMPS 11/07/2017    BACTERIA NONE 11/07/2017    RBCUA 3-5 11/07/2017    BLOODU NEGATIVE 11/07/2017    SPECGRAV 1.021 11/07/2017    GLUCOSEU NEGATIVE 11/03/2017       Radiology:       CT Lumbar Reconstruction WO Post Process   Final Result   Multilevel neural foraminal narrowing and central canal stenosis            **This report has been created using voice recognition software. It may contain minor errors which are inherent in voice recognition technology. **      Final report electronically signed by Dr. Tracee Cage on 11/7/2017 4:49 PM      CT ABDOMEN PELVIS WO IV CONTRAST Additional Contrast? None   Final Result   No acute abnormality.  Numerous

## 2017-11-08 NOTE — ED PROVIDER NOTES
Disease in his father; High Blood Pressure in his mother; Stroke in his mother. SOCIAL HISTORY      reports that he quit smoking about 67 years ago. His smoking use included Cigarettes. He has never used smokeless tobacco. He reports that he does not drink alcohol or use drugs. PHYSICAL EXAM     INITIAL VITALS:  oral temperature is 98.6 °F (37 °C). His blood pressure is 154/74 (abnormal) and his pulse is 60. His respiration is 18 and oxygen saturation is 98%. Physical Exam   Constitutional:  well-developed and well-nourished. HENT: Head: Normocephalic, atraumatic, Bilateral external ears normal, Oropharynx mosit, No oral exudates, Nose normal.   Eyes: PERRL, EOMI, Conjunctiva normal, No discharge. No scleral icterus  Neck: Normal range of motion, No tenderness, Supple  Lympatics: No lymphadenopathy. Cardiovascular: Normal rate, regular rhythm, S1 normal and S2 normal.  Exam reveals no gallop. Pulmonary/Chest: Effort normal and breath sounds normal. No accessory muscle usage or stridor. No respiratory distress. no wheezes. has no rales. exhibits no tenderness. Abdominal: Soft. Bowel sounds are normal.  exhibits no distension. There is no tenderness. There is no rebound and no guarding. Genitourinary:   Extremities: No edema, no tenderness, no cyanosis, no clubbing. Musculoskeletal: Good range of motion in major joints is observed. No major deformities noted. Neurological: Alert and oriented ×3, normal motor function, normal sensory function, no focal deficits. GCS 15  Skin: Skin is warm, dry and intact. No rash noted. No erythema.    Psychiatric: Affect normal, judgment normal, mood normal.  DIFFERENTIAL DIAGNOSIS:       DIAGNOSTIC RESULTS     EKG: All EKG's are interpreted by the Emergency Department Physician who either signs or Co-signs this chart in the absence of a cardiologist.      RADIOLOGY: non-plain film images(s) such as CT, Ultrasound and MRI are read by the radiologist.  Brandy Pelayo in his UA. Clinically he also looks dehydrated. Based on patient's age, decreased oral intake, will consult with medicine for further management, consider admitting patient to the hospital for further treatment. PATIENT REFERRED TO:  Keena Bowers MD  [facility] [place] [id number]  [phone]    Call on 11/8/2017  RE-CHECK AND FURTHER TESTING AS NEEDED      DISCHARGE MEDICATIONS:  New Prescriptions    HYDROCODONE-ACETAMINOPHEN (NORCO) 5-325 MG PER TABLET    Take 1 tablet by mouth every 6 hours as needed for Pain .        (Please note that portions of this note were completed with a voice recognition program.  Efforts were made to edit the dictations but occasionally words are mis-transcribed.)    Ana Russo, [facility],   11/07/17 0085

## 2017-11-08 NOTE — PLAN OF CARE
Problem: Falls - Risk of  Goal: Absence of falls  Outcome: Ongoing  Pt free from falls this shift. Pt educated on fall risk precautions. Bed alarm on. Call light within reach. Bed in lowest position. Side rails up X2. Nonskid footwear on. Hourly rounding in place will continue to monitor     Problem: Risk for Impaired Skin Integrity  Goal: Tissue integrity - skin and mucous membranes  Structural intactness and normal physiological function of skin and  mucous membranes. Outcome: Ongoing  No new skin breakdown. coccyx red but blanchable. SPR mattress on for prevention. Bruising scattered on extremities       Problem: Pain:  Goal: Pain level will decrease  Pain level will decrease   Outcome: Ongoing  Pt having pain in back/flank. Pt given tylenol. Pt states that tylenol did not help. Heating pad given to pt with some relief. RN contacted hospitalist and Roxicodone ordered. Will continue to monitor pts pain    Goal: Control of acute pain  Control of acute pain   Outcome: Ongoing  Pt having pain in back/flank. Pt given tylenol. Pt states that tylenol did not help. Heating pad given to pt with some relief. RN contacted hospitalist and Roxicodone ordered. Will continue to monitor pts pain    Goal: Control of chronic pain  Control of chronic pain   Outcome: Ongoing  Pt having pain in back/flank. Pt given tylenol. Pt states that tylenol did not help. Heating pad given to pt with some relief. RN contacted hospitalist and Roxicodone ordered. Will continue to monitor pts pain      Problem: Nutrition  Goal: Optimal nutrition therapy  Outcome: Ongoing  Pt receiving tube feeds TID     Comments: Care plan reviewed with patient. Patient verbalize understanding of the plan of care and contribute to goal setting.

## 2017-11-08 NOTE — ED NOTES
Patient transported to [facility] via cart in stable condition. Patient monitored on cardiac telemetry.      Contact made with  prior to transport        Wiliam Cabrera, EMT-P  11/07/17 3792

## 2017-11-08 NOTE — PROGRESS NOTES
Clinical Pharmacy Note    Aleks Silveira is a 80 y.o. male for whom pharmacy has been asked to manage warfarin therapy. Reason for Admission: pyelonephritis    Consulting Physician: Dr. Manuelito Covarrubias  Warfarin dose prior to admission: 6 mg on Monday, Wednesday, and Friday; 4 mg all other days  Warfarin indication: atrial fibrillation  Target INR range: 2-3   Outpatient warfarin provider: Dr. Belen Land    Past Medical History:   Diagnosis Date    Arthritis     Barretts esophagus     CAD (coronary artery disease)     Status post CABG in 1990, Status post stent    COPD ([facility] obstructive [facility]) [facility]     Diastolic CHF ([facility] 39/03/6080    GERD (gastroesophageal reflux disease)     History of pneumonia     Hyperlipidemia     Hypertension     Type II or unspecified type diabetes mellitus without mention of complication, not stated as uncontrolled     diagnosed 11/2012    UTI (urinary tract infection) due to Enterococcus 1/12/2017                Recent Labs      11/08/17   0607   INR  3.62*     Recent Labs      11/07/17   1612  11/08/17   0607   HGB  9.9*  8.6*   HCT  29.8*  26.1*   PLT  159  134       Current warfarin drug-drug interactions: aspirin (home), clopidogrel (home)      Date INR Warfarin Dose   11/8/2017 3.62 No coumadin                                   Daily PT/INR until stable within therapeutic range. Thank you for the consult.    Mariano Chadwick, IsaiasD, BCPS  11/8/2017  11:02 AM

## 2017-11-08 NOTE — CARE COORDINATION
11/8/17, 7:24 AM      Giovani Batista       Admitted from: ER 11/7/2017/ [address] [facility] day: 0   Location: [facility] Reason for admit: Urinary tract infection [N39.0] Status: OBS  Admit order signed?: no  PMH:  has a past medical history of Arthritis; Barretts esophagus; CAD (coronary artery disease); COPD (chronic obstructive pulmonary disease) ([facility] [address] Diastolic CHF ([facility] [address] GERD (gastroesophageal reflux disease); History of pneumonia; Hyperlipidemia; Hypertension; Type II or unspecified type diabetes mellitus without mention of complication, not stated as uncontrolled; and UTI (urinary tract infection) due to Enterococcus. Medications:  Scheduled Meds:   warfarin (COUMADIN) daily dosing (placeholder)   Other RX Placeholder    cefTRIAXone (ROCEPHIN) IV  1 g Intravenous Q24H    aspirin  81 mg Oral Daily    atorvastatin  20 mg Oral Nightly    clopidogrel  75 mg Oral Daily    pantoprazole  40 mg Oral QAM AC    glimepiride  1 mg PEG Tube QAM AC    isosorbide dinitrate  20 mg Oral TID    sodium chloride flush  10 mL Intravenous 2 times per day     Continuous Infusions:   dextrose 5 % and 0.9 % NaCl 100 mL/hr at 11/08/17 0518      Pertinent Info/Orders/Treatment Plan:  Hgb 8.6, INR 3.62, UA+.  IVF, IV ATB. Telemetry monitoring, ventricular paced rhythm. PT/OT evals. Diet: Diet NPO Effective Now   DVT Prophylaxis: Coumadin  Smoking status:  reports that he quit smoking about 67 years ago. His smoking use included Cigarettes.  He has never used smokeless tobacco.   Influenza Vaccination Screening Completed: yes  Pneumonia Vaccination Screening Completed: yes  Core measures: vte  PCP: Swapna Powers MD  Readmission:   no  Risk Score: 19.5     Discharge Planning  Current Residence:  Private Residence  Living Arrangements:  Children   Support Systems:  Friends/Neighbors, Children  Current Services PTA:     Potential Assistance Needed:  N/A  Potential Assistance Purchasing Medications:  No  Does patient want to participate in local refill/ meds to beds program?  No  Type of Home Care Services:  None  Patient expects to be discharged to:  home`  Expected Discharge date:  11/09/17  Follow Up Appointment: Best Day/ Time: Wednesday PM    Discharge Plan: Spoke with Moustapha Astudillo, he plans return home with his son Kya Caicedo at discharge. He states between Kya Caicedo and his granddaughter Darlene Valentin, he is well cared for. He has aides twice weekly that assist him with bathing. He ambulates with a walker and takes all nutrition through his PEG tube. He denies additional needs.       Evaluation: yes

## 2017-11-08 NOTE — PROGRESS NOTES
which are inherent in voice recognition technology. **      Final report electronically signed by Dr. Jamee Ann on 11/7/2017 4:49 PM      CT ABDOMEN PELVIS WO IV CONTRAST Additional Contrast? None   Final Result   No acute abnormality. Numerous chronic findings detailed above. There is high density fluid in the stomach may be result of radiopaque medication or the presence of blood. **This report has been created using voice recognition software. It may contain minor errors which are inherent in voice recognition technology. **      Final report electronically signed by Dr. Jamee Ann on 11/7/2017 4:43 PM        Echocardiogram 8/23/17  Summary   Normal left ventricle size and systolic function. Ejection fraction was   estimated at 60 %. There were no regional left ventricular wall motion   abnormalities and wall thickness was within normal limits.   The left atrium is Severely dilated.   Mild to Moderately enlarged right atrium size.   Aortic valve appears tricuspid. Aortic valve leaflets are Severely   calcified. Leaflets exhibited severely increased thickness and severely   reduced cuspal separation of the aortic valve. Mild aortic regurgitation   is noted.   There is severe aortic stenosis with valve area of 0.9 sq cm.  The maximum   aortic valve gradient is 62 mmhg mmHg, the mean gradient is 40 mmHg, and   the peak velocity is 4 cm/s.      Signature      ----------------------------------------------------------------   Electronically signed by Vicente Fleming MD (Interpreting   physician) on 08/23/2017 at 05:33 PM    Diet: Diet NPO Effective Now    DVT prophylaxis: [] Lovenox                                 [] SCDs                                 [] SQ Heparin                                 [] Encourage ambulation           [x] Already on Anticoagulation--Coumadin      Disposition:    [x] Home       [] TCU       [] Rehab       [] Psych       [] SNF       [] [facility]       []

## 2017-11-08 NOTE — PROGRESS NOTES
refuses 6th can)    Nutrition Risk Level: High    Nutrition Interventions:   Continue NPO, Continue current Tube Feeding. Note - pt. Had been able to maintain weight on previous TF order. Anticipate pt will lose weight with decrease in TF provision. Continued Inpatient Monitoring, Education Initiated (Encouraged son to continue to offer full TF regimen, free water flushes.)    Nutrition Evaluation:   · Evaluation: Goals set   · Goals: Pt. will tolerate enteral nutrition to provide 75% or more of estimated nutrition needs. · Monitoring: NPO Status, TF Intake, TF Tolerance, Skin Integrity, Ascites/Edema, Weight, Pertinent Labs, Constipation    See Adult Nutrition Doc Flowsheet for more detail.      Electronically signed by Tia Padilla RD, LD on 11/8/17 at 10:43 AM    Contact Number: [phone]

## 2017-11-09 NOTE — PROGRESS NOTES
Subjective  Chart Reviewed: Yes (H&P, Rn notes, imaging, orders)  Patient assessed for rehabilitation services?: Yes  Family / Caregiver Present: No    Subjective: Pleasant and cooperative, appears confused, reoriented to location multiple times throughout session. General:  Overall Orientation Status:  (reoriented multiple times throughout session, pt thinking he was at home or at his son's home.)  Orientation Level: Disoriented to situation, Disoriented to time, Disoriented to place, Oriented to person    Vision: Impaired    Hearing: Exceptions to [facility]  Hearing Exceptions: Hard of hearing/hearing concerns         Pain:  Pain Assessment  Patient Currently in Pain: Denies       Social/Functional:  Lives With: Alone  Type of Home: Apartment  Home Layout: One level  Home Equipment: 4 wheeled walker          Receives Help From: Family  ADL Assistance: Needs assistance  Homemaking Assistance: Needs assistance  Homemaking Responsibilities: No    Ambulation Assistance: Independent  Transfer Assistance: Independent    Active : No  Additional Comments: Pt is known to this therapist. Pt is confused this date requiring multiple cues and reorientation. Pt reports Balta Mc (son) and Haroon May (granddaughter) continue to assist at home as needed. Balta Mc assists with PEG tube feeds. Pt reports limited walking at home and has an aide who assists with ADLs 2x/wk.     Objective        Overall Cognitive Status: Exceptions  Cognition Comment: decreased processing, confusion, inattention, disorientation                 LUE AROM (degrees)  LUE AROM : WFL       RUE PROM (degrees)  RUE PROM: WFL          LUE Strength  LUE Strength Comment: 4/5                RUE Strength  RUE Strength Comment: 4/5                ADL  Grooming: Setup (wash cloth to wash face)  LE Dressing: Maximum assistance (donning new brief after incontinence, and Erma for sock)  Toileting: Maximum assistance (x 3 trials, assist for placing urinal x 2 trials then sitting on BSC, pt able to void ~50mL but continues to have urgency to void. RN notified)          Transfers  Sit to stand: Moderate assistance  Stand to sit: Moderate assistance  Transfer Comments: recliner x 3 trials  Toilet Transfers  Toilet - Technique: Ambulating  Equipment Used: Extra wide bedside commode  Toilet Transfer: Moderate assistance    Balance  Sitting Balance: Contact guard assistance (d/t confusion)  Standing Balance: Minimal assistance     Time: during hygiene and toileting tasks x 1-3 min x 3 trials     Functional Mobility  Functional - Mobility Device: Rolling Walker  Activity: Other  Assist Level: Minimal assistance  Functional Mobility Comments: to/from [facility], mod-max cues safety, significant back pain with mobility, and significant safety concerns, assisted with advancing RW          Activity Tolerance:  Activity Tolerance: Patient limited by fatigue, Patient limited by pain, Treatment limited secondary to decreased cognition  Activity Tolerance: Treatment initiated: Pt completed toileting x 3 trials with 1 trial walking to/from [facility] d/t urge to have BM. Pt required multiple cues to reorient to situation/place/time throughout session and pt exhibiting significant back discomfort with standing and walking. Assessment:  Assessment: Pt at home alone PTA with frequent assist from family and aide x 2/wk. Pt walking PTA, and now requiring assist for all ADLs, mobility and IADLs. Pt requiring additional OT intervention to increase indep with self care.   Performance deficits / Impairments: Decreased functional mobility , Decreased ADL status, Decreased balance, Decreased strength, Decreased endurance  Prognosis: Good, Guarded  Discharge Recommendations: 24 hour supervision or assist, Subacute/Skilled Nursing Facility, Patient would benefit from continued therapy after discharge, pt is unsafe to return home alone at this time, must have 24 hour assist.    Clinical Decision Making: Clinical Decision making Inpatient Daily Activity Raw Score: 14  AM-PAC Inpatient ADL T-Scale Score : 33.39  ADL Inpatient CMS 0-100% Score: 59.67  ADL Inpatient CMS G-Code Modifier : CK

## 2017-11-09 NOTE — PLAN OF CARE
Problem: Falls - Risk of  Goal: Absence of falls  Outcome: Ongoing  Pt is fall risk. Fall Armband in place. Bed in low position. Pt instructed to use call light when AMB. Problem: Risk for Impaired Skin Integrity  Goal: Tissue integrity - skin and mucous membranes  Structural intactness and normal physiological function of skin and  mucous membranes. Outcome: Ongoing  Pt has megha scale order set (SPR mattress, foam cushion for seat, etc)  Skin assessed q4h for any redness/bryce. No new breakdown to report this shift and will continue to monitor. Problem: Pain:  Goal: Pain level will decrease  Pain level will decrease   Outcome: Ongoing  Pt has decreased pain through guided imagery, distraction, reposition, and medication    Goal: Control of acute pain  Control of acute pain   Outcome: Ongoing  Pt has decreased pain through guided imagery, distraction, reposition, and medication      Comments: Care plan reviewed with patient. Patient verbalize understanding of the plan of care and contribute to goal setting.

## 2017-11-09 NOTE — PROGRESS NOTES
Hospitalist Progress Note    Patient:  Michael Mcgregor      Unit/Bed:8B-32/032-A    YOB: 1927    MRN: [id number]       Acct: [de-identified]     PCP: Adelita West MD    Date of Admission: 11/7/2017    Chief Complaint: dysuria, flank pain, poor appetite    Hospital Course: This patient was admitted to [address] [place] [address] on 11/7/17 for chief complaints of dysuria, left flank pain, poor appetite. According to H&P the patient was diagnosed with acute cystitis and placed on Amoxicillin, he only received one dose prior to coming to the ED. In the ED felt to have acute pyelonephritis and started on IV Rocephin. CT of ab/pelvis was completed and negative for acute process. Subjective: Patient is resting in bed.    BACK PAIN  PLEASENT      Medications:  Reviewed    Infusion Medications    dextrose      sodium chloride 100 mL/hr at 11/08/17 1421     Scheduled Medications    warfarin  5 mg Oral Once    insulin lispro  0-18 Units Subcutaneous TID WC    insulin lispro  0-9 Units Subcutaneous Nightly    [START ON 11/10/2017] glimepiride  4 mg PEG Tube QAM AC    warfarin (COUMADIN) daily dosing (placeholder)   Other RX Placeholder    cefTRIAXone (ROCEPHIN) IV  1 g Intravenous Q24H    aspirin  81 mg Oral Daily    atorvastatin  20 mg Oral Nightly    clopidogrel  75 mg Oral Daily    pantoprazole  40 mg Oral QAM AC    isosorbide dinitrate  20 mg Oral TID    sodium chloride flush  10 mL Intravenous 2 times per day     PRN Meds: glucose, dextrose, glucagon (rDNA), dextrose, acetaminophen, oxyCODONE, nitroGLYCERIN, sodium chloride flush, magnesium hydroxide, ondansetron      Intake/Output Summary (Last 24 hours) at 11/09/17 1315  Last data filed at 11/09/17 1041   Gross per 24 hour   Intake          2903.55 ml   Output              625 ml   Net          2278.55 ml       Diet:  Diet NPO Effective Now    Exam:  /63   Pulse 56   Temp 98.9 °F (37.2 °C) (Oral)   Resp 18   Ht 5' 9\" (1.753 m)   Wt 130 lb 6.4 Disposition:    [x] Home       [] TCU       [] Rehab       [] Psych       [] SNF       [] [facility]       [] Other-    Code Status: Full Code    PT/OT Eval Status: consulted     Assessment/Plan:    Active Hospital Problems    Diagnosis Date Noted    Acute right flank pain [R10.9]     Pyelonephritis [N12] 11/07/2017    Dehydration, moderate [E86.0] 11/07/2017    Abnormal BUN-to-creatinine ratio [R79.89]     PEG (percutaneous endoscopic gastrostomy) status ([facility] [Z93.1]     Pacemaker [Z95.0]     Type 2 diabetes mellitus with complication, with long-term current use of insulin ([facility]) [E11.8, Z79.4]     Chronic obstructive pulmonary disease ([facility] [address] [facility] [J44.9]     Anticoagulated on Coumadin [Z51.81, Z79.01] 02/26/2016    Normocytic anemia [D64.9] 10/20/2014    Farmer's esophagus with dysplasia [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 Acute Pyelonephritis (POA)--CT of ab/pelvis on 11/7: No acute abnormality. U/A nitrite positive, large leuk esterase, WBC clumps. Follow urine culture, on IV Rocephin (11/7). Obtain lactic acid and procalcitonin. No blood cultures obtained prior to antibiotics. 2. CAD--S/P stenting 9/20/2017,CABG 1990. On aspirin, statin, Plavix, Imdur  3. 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 Follows with Dr. Remy Grady  4. Paroxysmal Atrial Flutter--paced on tele, chronically on Coumadin per Dr. Remy Grady. INR 3.62  5. Aortic Stenosis--echo on 8/23/17: normal LV size and systolic function, EF 59%, severe aortic stenosis with valve area of 0.9 sq cm, follows with Dr. Remy Grady. 6. History of Farmer's esophagus with esophageal stricture--S/P PEG tube for over 5 years. On tube feeds, dietician following for management. 7. DM-2, controlled--Hgb A1c 7.5 on 6/5/17, blood glucose 237. On Amaryl, add low dose sliding scale, hypoglycemia protocol. 8. Normocytic Anemia--Hgb 8.6 on 11/8, MCV 82.3. Anemia is chronic since 2012, baseline Hgb 10's. Monitor closely. Check occult stool.     9. Lumbar

## 2017-11-09 NOTE — CARE COORDINATION
11/9/17, 2:21 PM      [facility] day: 1  Location: -32/032-A Reason for admit: Urinary tract infection [N39.0]  Urinary tract infection [N39.0]   Treatment Plan of Care: Hgb 9.1, -300s. IVF, IV ATB, DM management, pain control. PT/OT ordered, held this a.m due to patient's confusion. Core Measures: vte  PCP: Robert Jay MD  Discharge Plan: Referral made to [facility].

## 2017-11-09 NOTE — PLAN OF CARE
Problem: Falls - Risk of  Goal: Absence of falls  Outcome: Ongoing  Pt free from falls this shift. Pt educated on fall risk precautions. Bed alarm on. Call light within reach. Bed in lowest position. Side rails up X2. Nonskid footwear on. Hourly rounding in place will continue to monitor     Problem: Risk for Impaired Skin Integrity  Goal: Tissue integrity - skin and mucous membranes  Structural intactness and normal physiological function of skin and  mucous membranes. Outcome: Ongoing  No new skin breakdown will continue to monitor. SPR mattress for prevention     Problem: Pain:  Goal: Pain level will decrease  Pain level will decrease   Outcome: Ongoing  Patient has back/flank pain. Pt given Kpad and oycodone and states this takes the pain away   Goal: Control of acute pain  Control of acute pain   Outcome: Ongoing  Patient has back/flank pain. Pt given Kpad and oycodone and states this takes the pain away   Goal: Control of chronic pain  Control of chronic pain   Outcome: Ongoing  Patient has back/flank pain. Pt given Olman Garcia and oycodone and states this takes the pain away     Problem: Nutrition  Goal: Optimal nutrition therapy  Outcome: Ongoing  Pt receiving tube feed TID     Comments: Care plan reviewed with patient. Patient verbalize understanding of the plan of care and contribute to goal setting.

## 2017-11-10 NOTE — PROGRESS NOTES
[address] [id number]  INPATIENT PHYSICAL THERAPY  EVALUATION  [facility] MED SURG 8B    Time In: 9710  Time Out: 5675  Timed Code Treatment Minutes: 23 Minutes  Minutes: 38        Date: 11/10/2017  Patient Name: Nabila Fernandes,  Gender:  male        MRN: [id number]  : 1927  (80 y.o.)      Referring Practitioner: Xenia Velez MD  Diagnosis: UTI  Additional Pertinent Hx: Nabila Fernandes is a 80 y.o. male who presents with complaint of flank pain, left flank region. Patient was recently diagnosed with urinary tract infection. He is on amoxicillin, has taken 1 days dose. He has no reports of trauma to his flank/back.      Past Medical History:   Diagnosis Date    Arthritis     Barretts esophagus     CAD (coronary artery disease)     Status post CABG in , Status post stent    [facility]     Diastolic CHF ([facility]     GERD (gastroesophageal reflux disease)     History of pneumonia     Hyperlipidemia     Hypertension     Type II or unspecified type diabetes mellitus without mention of complication, not stated as uncontrolled     diagnosed 2012    UTI (urinary tract infection) due to Enterococcus 2017     Past Surgical History:   Procedure Laterality Date    CARDIAC SURGERY      CHOLECYSTECTOMY      COLONOSCOPY      CORONARY ARTERY BYPASS GRAFT      4 vessel [place]    ESOPHAGEAL DILATATION  2007    Dr Annie Bhagat Right 1960    leg    GASTROSTOMY TUBE PLACEMENT      GASTROSTOMY TUBE PLACEMENT N/A 2017    PEG BUTTON CHANGE WITH NURSING STAFF performed by Caleb Lee MD at  [facility]    HERNIA REPAIR      x3   [facility] dual pacemaker    UPPER GASTROINTESTINAL ENDOSCOPY  2007    Dr Ousmane Dasilva       Restrictions/Precautions:  Restrictions/Precautions: General Precautions, Fall Risk    Position Activity Restriction  Other position/activity restrictions: chronic PEG     Subjective:  Chart Reviewed: Yes  Patient assessed for rehabilitation services?: Yes  Family / Caregiver Present: No  Subjective: RN approved session, pt is pleasant and very talkative, agreeable to eval and treat. General:  Overall Orientation Status: Impaired  Orientation Level: Oriented to place, Oriented to time, Oriented to person, Disoriented to situation  Follows Commands: Within Functional Limits    Vision: Impaired  Vision Exceptions: Wears glasses at all times    Hearing: Exceptions to [facility]  Hearing Exceptions: Hard of hearing/hearing concerns     Pain:  Denies (at rest, c/o low back pain with bed mobility). Social/Functional History:    Lives With: Alone  Type of Home: Apartment  Home Layout: One level  Home Equipment: 4 wheeled walker      Receives Help From: Family  ADL Assistance: Needs assistance  Homemaking Assistance: Needs assistance  Homemaking Responsibilities: No  Ambulation Assistance: Independent  Transfer Assistance: Independent    Active : No  Additional Comments: Pt is known to this therapist. Pt is confused this date requiring multiple cues and reorientation. Pt reports Otf Vanessa (son) and Rob Gastelum (granddaughter) continue to assist at home as needed. Otf Vanessa assists with PEG tube feeds. Pt reports limited walking at home and has an aide who assists with ADLs 2x/wk.     Objective:  RLE PROM: Exceptions  RLE General PROM: R knee ext limited ~25 degrees due to tight hamstrings, pt also voices previous R knee injury resulting in impaired ROM and strength     LLE PROM: Exceptions  LLE General PROM: L knee extension limited ~15 degrees from tight hamstrings    B hips 3+/5, B quads 3+/5 within available range, B DF 2+/5     RLE Tone: Normotonic  LLE Tone: Normotonic     Balance  Sitting - Static: Good  Sitting - Dynamic: Good  Standing - Static: Fair  Standing - Dynamic: Fair, -    Supine to Sit: Stand by assistance (with HOB elevated)  Scooting: Contact guard assistance    Transfers  Sit to Stand: Minimal Assistance (increased time to complete, pt hyperextends knee against edge of bed to assist in trunk ext)  Stand to sit: Contact guard assistance     Ambulation 1  Surface: level tile  Device: Rolling Walker  Assistance: Contact guard assistance  Quality of Gait: Pt amb with increased trunk flexion, increased hip/knee flexion, impaired toe clearance and absent heel strike. Distance: 50 feet  Comments: Pt requires verbal cues for posture increased step length    Exercises:  Comments: Pt performs supine B LE AROM while seated EOB: ankle pumps, marches, hip abd/add and AAROM B LAQ to perform through full range x 10 reps to increase strength for improved gait. Activity Tolerance:  Activity Tolerance: Patient Tolerated treatment well;Patient limited by endurance    Treatment Initiated: See above exercises. Additional transfer training performed with verbal cues for hand placement, increased time for pt to complete sit to stand transfer. Assessment: Body structures, Functions, Activity limitations: Decreased functional mobility , Decreased balance, Decreased ROM, Decreased endurance, Decreased strength  Assessment: Pt admitted due to UTI. Pt pleasant and cooperative, limited overall by impaired endurance and strength. Pt demonstrates decreased bed mobility, LE ROM and strength, transfers, balance, activity tolernace and gait indicating the need for skilled PT services. Prognosis: Good    Clinical Presentation: Moderate - Evolving with Changing Characteristics: Pt admitted due to UTI. Pt pleasant and cooperative, limited overall by impaired endurance and strength. Pt demonstrates decreased bed mobility, LE ROM and strength, transfers, balance, activity tolernace and gait indicating the need for skilled PT services.     Decision Making: High Complexitybased on patient assessment and decision making process of determining plan of care and establishing reasonable expectations for measurable functional outcomes    REQUIRES PT FOLLOW UP: Yes  Discharge Recommendations: Patient would benefit from continued therapy after discharge, [address] with PT    Patient Education:  Patient Education: POC    Equipment Recommendations:  Equipment Needed: No    Safety:  Type of devices: All fall risk precautions in place, Call light within reach, Chair alarm in place, Left in chair, Gait belt, Patient at risk for falls, Nurse notified  Restraints  Initially in place: No    Plan:  Times per week: 3-5 X GM  Times per day: Daily  Specific instructions for Next Treatment: B LE strengthening/hamstring stetches, transfer training, gait training, balance training. Current Treatment Recommendations: Strengthening, Functional Mobility Training, Neuromuscular Re-education, ROM, Transfer Training, Balance Training, Endurance Training, Gait Training, Safety Education & Training    Goals:  Patient goals : To get better. Short term goals  Time Frame for Short term goals: 2 weeks  Short term goal 1: Pt to transfer supine <--> sit S to enable pt to get in/out of bed. Short term goal 2: Pt to transfer sit <--> stand SBA for increased functional mobility. Short term goal 3: Pt to ambulate 75 feet with RW SBA for household ambulation. Long term goals  Time Frame for Long term goals : NA due to short length of stay. Evaluation Complexity: Based on the findings of patient history, examination, clinical presentation, and decision making during this evaluation, the evaluation of Howard Mancilla  is of medium complexity. PT G-Codes  Functional Limitation: Mobility: Walking and moving around  Mobility: Walking and Moving Around Current Status (): At least 40 percent but less than 60 percent impaired, limited or restricted  Mobility: Walking and Moving Around Goal Status ():  At least 20 percent but less than 40 percent impaired, limited or restricted    AM-PAC

## 2017-11-10 NOTE — PROGRESS NOTES
Hospitalist Progress Note    Patient:  Aleks Silveira      Unit/Bed:8B-32/032-A    YOB: 1927    MRN: [id number]       Acct: [de-identified]     PCP: Misael Jessica MD    Date of Admission: 11/7/2017    Chief Complaint: dysuria, flank pain, poor appetite    Hospital Course: This patient was admitted to [facility] on 11/7/17 for chief complaints of dysuria, left flank pain, poor appetite. According to H&P the patient was diagnosed with acute cystitis and placed on Amoxicillin, he only received one dose prior to coming to the ED. In the ED felt to have acute pyelonephritis and started on IV Rocephin. CT of ab/pelvis was completed and negative for acute process. Patient originally from at home with son prior to admission, planning discharge to [facility]. Subjective: Patient is resting in bed, he states he is feeling better this am. Reports flank pain has improved since first admitted. He denies fever, chills, SOB, chest pain, abdominal pain, nausea, vomiting, bowel changes. Denies dysuria, frequency or hematuria. Per RN mentation improved today.      Medications:  Reviewed    Infusion Medications    dextrose      sodium chloride 100 mL/hr at 11/09/17 2313     Scheduled Medications    insulin lispro  0-18 Units Subcutaneous TID WC    insulin lispro  0-9 Units Subcutaneous Nightly    glimepiride  4 mg PEG Tube QAM AC    warfarin (COUMADIN) daily dosing (placeholder)   Other RX Placeholder    cefTRIAXone (ROCEPHIN) IV  1 g Intravenous Q24H    aspirin  81 mg Oral Daily    atorvastatin  20 mg Oral Nightly    clopidogrel  75 mg Oral Daily    pantoprazole  40 mg Oral QAM AC    isosorbide dinitrate  20 mg Oral TID    sodium chloride flush  10 mL Intravenous 2 times per day     PRN Meds: glucose, dextrose, glucagon (rDNA), dextrose, acetaminophen, oxyCODONE, nitroGLYCERIN, sodium chloride flush, magnesium hydroxide, ondansetron      Intake/Output Summary (Last 24 hours) at 11/10/17 1896  Last data filed at 11/10/17 0851   Gross per 24 hour   Intake             2175 ml   Output              475 ml   Net             1700 ml       Diet:  DIET TUBE FEED CONTINUOUS/CYCLIC NPO; Other Tube Feeding (must specify product in comment) (Jevity 1.0 (family brings in)); (PEG); Cyclic    Exam:  BP (!) 146/67   Pulse 62   Temp 97.4 °F (36.3 °C) (Oral)   Resp 18   Ht 5' 9\" (1.753 m)   Wt 132 lb 14.4 oz (60.3 kg)   SpO2 97%   BMI 19.63 kg/m²     General appearance: No apparent distress, elderly, chronically ill appearing, cooperative. HEENT: Pupils equal, round, and reactive to light. Conjunctivae/corneas clear. Neck: Supple, with full range of motion. No jugular venous distention. Trachea midline. Respiratory:  Normal respiratory effort. Soft rales noted bilaterally, no wheezes or rhonchi. On room air. Cardiovascular: Regular rate and rhythm with normal S1/s2. Loud holosystolic murmur heard throughout. On room air with O2 sat of 97%. Abdomen: Soft, non-distended with normal bowel sounds. PEG in place supraumbilical  Musculoskeletal: No clubbing, cyanosis or edema bilaterally. Full range of motion without deformity. Skin: multiple areas of ecchymosis especially to bilateral upper extremities   Neurologic:  Neurovascularly intact without any focal sensory/motor deficits. Cranial nerves: II-XII intact, grossly non-focal.  Psychiatric: alert to person, place, month. Unable year.    Capillary Refill: Brisk,< 3 seconds   Peripheral Pulses: +2 palpable, equal bilaterally       Labs:   Recent Labs      11/07/17   1612  11/08/17   0607  11/09/17   0607   WBC  7.7  6.7  10.0   HGB  9.9*  8.6*  9.1*   HCT  29.8*  26.1*  27.8*   PLT  159  134  136     Recent Labs      11/07/17   1612  11/08/17   0607  11/09/17   0607   NA  137  136  135   K  5.6*  4.5  4.6   CL  102  103  102   CO2  24  22*  18*   BUN  35*  27*  23*   CREATININE  0.6  0.5  0.5   CALCIUM  9.8  9.0  8.8       Recent Labs      11/08/17   0607  11/09/17   7457 11/10/17   0547   INR  3.62*  2.73*  2.24*       Urinalysis:      Lab Results   Component Value Date    NITRU POSITIVE 11/07/2017    WBCUA 75-100W/CLUMPS 11/07/2017    BACTERIA NONE 11/07/2017    RBCUA 3-5 11/07/2017    BLOODU NEGATIVE 11/07/2017    SPECGRAV 1.021 11/07/2017    GLUCOSEU NEGATIVE 11/03/2017       Radiology:  CT Lumbar Reconstruction WO Post Process   Final Result   Multilevel neural foraminal narrowing and central canal stenosis            **This report has been created using voice recognition software. It may contain minor errors which are inherent in voice recognition technology. **      Final report electronically signed by Dr. Sylvia Wise on 11/7/2017 4:49 PM      CT ABDOMEN PELVIS WO IV CONTRAST Additional Contrast? None   Final Result   No acute abnormality. Numerous chronic findings detailed above. There is high density fluid in the stomach may be result of radiopaque medication or the presence of blood. **This report has been created using voice recognition software. It may contain minor errors which are inherent in voice recognition technology. **      Final report electronically signed by Dr. Sylvia Wise on 11/7/2017 4:43 PM        Echocardiogram 8/23/17  Summary   Normal left ventricle size and systolic function. Ejection fraction was   estimated at 60 %. There were no regional left ventricular wall motion   abnormalities and wall thickness was within normal limits.   The left atrium is Severely dilated.   Mild to Moderately enlarged right atrium size.   Aortic valve appears tricuspid. Aortic valve leaflets are Severely   calcified. Leaflets exhibited severely increased thickness and severely   reduced cuspal separation of the aortic valve. Mild aortic regurgitation   is noted.   There is severe aortic stenosis with valve area of 0.9 sq cm.  The maximum   aortic valve gradient is 62 mmhg mmHg, the mean gradient is 40 mmHg, and   the peak velocity is 4

## 2017-11-10 NOTE — PROGRESS NOTES
Clinical Pharmacy Note    Warfarin consult follow-up    Recent Labs      11/10/17   0547   INR  2.24*     Recent Labs      11/07/17   1612  11/08/17   0607  11/09/17   0607   HGB  9.9*  8.6*  9.1*   HCT  29.8*  26.1*  27.8*   PLT  159  134  136       Significant Drug-Drug Interactions:  New warfarin drug-drug interactions: none  Discontinued drug-drug interactions: none  Current warfarin drug-drug interactions: aspirin (home), clopidogrel (home)        Date INR Warfarin Dose   11/8/2017 3.62 No coumadin    11/9/2017  2.73  5 mg     11/10/2017   2.24  5mg        Notes:                     Daily PT/INR until stable within therapeutic range. Nain Sanchez D., BCPS 11/10/2017 9:47 AM

## 2017-11-10 NOTE — PROGRESS NOTES
[address]  INPATIENT OCCUPATIONAL THERAPY  [facility] MED SURG 8B  DAILY NOTE    Time:  Time In: 1500  Time Out: 1523  Timed Code Treatment Minutes: 23 Minutes  Minutes: 23     Date: 11/10/2017  Patient Name: Rosana Hardy,   Gender: male      Room: -32/032-A  MRN: [id number]  : 1927  (80 y.o.)  Referring Practitioner: Eve Murillo MD  Diagnosis: UTI  Additional Pertinent Hx: Rosana Hrady is a 80 y.o. male who presents with complaint of flank pain, left flank region. Patient was recently diagnosed with urinary tract infection. He is on amoxicillin, has taken 1 days dose.      Restrictions/Precautions:  Restrictions/Precautions: General Precautions, Fall Risk    Position Activity Restriction  Other position/activity restrictions: chronic PEG    Past Medical History:   Diagnosis Date    Arthritis     Barretts esophagus     CAD (coronary artery disease)     Status post CABG in , Status post stent    [facility]     [facility]     GERD (gastroesophageal reflux disease)     History of pneumonia     Hyperlipidemia     Hypertension     Type II or unspecified type diabetes mellitus without mention of complication, not stated as uncontrolled     diagnosed 2012    UTI (urinary tract infection) due to Enterococcus 2017     Past Surgical History:   Procedure Laterality Date    CARDIAC SURGERY      CHOLECYSTECTOMY      COLONOSCOPY      CORONARY ARTERY BYPASS GRAFT      4 vessel [place]    ESOPHAGEAL DILATATION  2007    Dr Kaylee Bean Right 1960    leg    GASTROSTOMY TUBE PLACEMENT      GASTROSTOMY TUBE PLACEMENT N/A 2017    PEG BUTTON CHANGE WITH NURSING STAFF performed by Bolivar Melendez MD at [facility]    HERNIA REPAIR      x3   [facility] dual pacemaker    UPPER GASTROINTESTINAL ENDOSCOPY  2007    Dr Maik Reyes Training    Goals:       Short term goals  Time Frame for Short term goals: 1 week  Short term goal 1: Pt will complete various t/fs with Erma to increase indep with toilet t/fs. Short term goal 2: Pt will complete functional mobility within environment with AD and <min cue safety to increase indep with accessing environment. Short term goal 3: Pt will complete dynamic standing task x 4 min with CGA to increase balance required to complete grooming. Short term goal 4: Pt will complete LB ADL with <Erma to increase indep with self care. Long term goals  Time Frame for Long term goals : No LTG d/t short ELOS.

## 2017-11-10 NOTE — CARE COORDINATION
11/10/17, 1:32 PM    DISCHARGE BARRIERS      Spoke with Gerardo Bear from [facility], patient accepted at [facility]. Informed patient may not be ready for discharge tomorrow. Spoke with Patients son, Marry Uriostegui, informed him patient has been accepted at [facility]. Marry Uriostegui stated he wants to visit first, advised everything is set for [facility] possibly tomorrow.

## 2017-11-11 NOTE — PLAN OF CARE
Problem: Falls - Risk of  Goal: Absence of falls  Outcome: Ongoing  Patient free of falls this shift. Patient on falling star program. Fall band intact. RN visually checks on patient with hourly rounds. Patient tolerates ambulation each shift. Problem: Pain:  Goal: Pain level will decrease  Pain level will decrease   Outcome: Ongoing  Patient denies pain this shift. Problem: Safety:  Goal: Free from accidental physical injury  Free from accidental physical injury   Outcome: Ongoing  Patient is free from injury this shift.

## 2017-11-11 NOTE — PROGRESS NOTES
Clinical Pharmacy Note    Warfarin consult follow-up    Recent Labs      11/11/17   0800   INR  2.53*     Recent Labs      11/09/17   0607  11/10/17   0920  11/11/17   0800   HGB  9.1*  8.2*  9.3*   HCT  27.8*  25.3*  27.8*   PLT  136  113*  136       Significant Drug-Drug Interactions:  New warfarin drug-drug interactions: None  Discontinued drug-drug interactions: None  Current warfarin drug-drug interactions: aspirin (home), clopidogrel (home)        Date INR Warfarin Dose   11/8/2017 3.62 No coumadin    11/9/2017  2.73  5 mg     11/10/2017   2.24  5mg    11/11/2017  2.53  4 mg                                  Notes:                     Daily PT/INR until stable within therapeutic range.

## 2017-11-11 NOTE — PROGRESS NOTES
Hospitalist Progress Note    Patient:  Nabila Fernandes      Unit/Bed:8B-32/032-A    YOB: 1927    MRN: [id number]       Acct: [de-identified]     PCP: Hermila Shepard MD    Date of Admission: 11/7/2017    Chief Complaint: dysuria, flank pain, poor appetite    Hospital Course: This patient was admitted to [facility] on 11/7/17 for chief complaints of dysuria, left flank pain, poor appetite. According to H&P the patient was diagnosed with acute cystitis and placed on Amoxicillin, he only received one dose prior to coming to the ED. In the ED felt to have acute pyelonephritis and started on IV Rocephin. CT of ab/pelvis was completed and negative for acute process. Patient originally from at home with son prior to admission, planning discharge to [facility]. Subjective: Patient is resting in bed this am, he states he is very hungry and is asking for am tube feed. He reports improvement in back/flank pain. Denies dysuria, hematuria. Denies fever, chills, SOB, chest pain, abdominal pain, nausea, vomiting, bowel changes.      Medications:  Reviewed    Infusion Medications    dextrose       Scheduled Medications    bumetanide  1 mg Intravenous BID    insulin lispro  0-18 Units Subcutaneous TID WC    insulin lispro  0-9 Units Subcutaneous Nightly    glimepiride  4 mg PEG Tube QAM AC    warfarin (COUMADIN) daily dosing (placeholder)   Other RX Placeholder    cefTRIAXone (ROCEPHIN) IV  1 g Intravenous Q24H    aspirin  81 mg Oral Daily    atorvastatin  20 mg Oral Nightly    clopidogrel  75 mg Oral Daily    pantoprazole  40 mg Oral QAM AC    isosorbide dinitrate  20 mg Oral TID    sodium chloride flush  10 mL Intravenous 2 times per day     PRN Meds: glucose, dextrose, glucagon (rDNA), dextrose, acetaminophen, oxyCODONE, nitroGLYCERIN, sodium chloride flush, magnesium hydroxide, ondansetron      Intake/Output Summary (Last 24 hours) at 11/11/17 1806  Last data filed at 11/11/17 1511   Gross per 24 hour statin, Plavix, Imdur  5. 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 Follows with Dr. Millie Bell  6. Paroxysmal Atrial Flutter--paced on tele, chronically on Coumadin per Dr. Millie Bell. INR 2.24  7. Aortic Stenosis--echo on 8/23/17: normal LV size and systolic function, EF 93%, severe aortic stenosis with valve area of 0.9 sq cm, follows with Dr. Millie Bell. Cardiology following outpatient for possible TAVR  8. History of Farmer's esophagus with esophageal stricture--S/P PEG tube for over 5 years. On tube feeds per dietician management- tolerating well. 9. DM-2, controlled--Hgb A1c 7.5 on 6/5/17, blood glucose 194. Continue Amaryl, sliding scale   10. Normocytic Anemia--Hgb 9.3 on 11/9, MCV 83.2. Anemia is chronic since 2012, baseline Hgb 10's. Occult stool negative. 11. Lumbar Degenerative Disc Disease --CT Lspine: severe right sided neural foraminal narrowing at L4-5 level. 12. COPD without acute exacerbation  13. Hyperkalemia--resolved. Dispo: add BNP, continue treatment as above. Follow pending urine culture. Anticipate possible discharge to [facility] in am.    Update: BNP elevated at [address].0, will begin IV Bumex. Monitor I&O closely. Second Update: Patient had NSVT 16 beats on tele. Pacer interrogation added, cardiology consult added.     I have discussed this patient's care and plan with Dr. Terry Clark   Electronically signed by Yadi Cox PA-C on 11/11/2017 at 6:06 PM

## 2017-11-12 NOTE — PLAN OF CARE
Problem: Falls - Risk of  Goal: Absence of falls  Outcome: Ongoing  Patient a fall risk. Fall band intact, falling star magnet on door. Hourly visual rounding completed and bed alarm activated. Problem: Risk for Impaired Skin Integrity  Goal: Tissue integrity - skin and mucous membranes  Structural intactness and normal physiological function of skin and  mucous membranes. Outcome: Ongoing  Patients skin assessed for signs of skin breakdown. Patient repositioned frequently. Heels elevated. Problem: Pain:  Goal: Pain level will decrease  Pain level will decrease   Outcome: Ongoing  PRN medications given as ordered. Pain assessed every hour during hourly rounding and reassessed an hour after pain medication given. Problem: Nutrition  Goal: Optimal nutrition therapy  Outcome: Ongoing  Tolerating tube feed well. Comments: Care plan reviewed with patient. Patient  verbalize understanding of the plan of care and contribute to goal setting.

## 2017-11-12 NOTE — FLOWSHEET NOTE
11/12/17 1320   Provider Notification   Reason for Communication Review case   Provider Name [facility]   Provider Notification Physician Assistant   Method of Communication Secure Message   Response Other (Comment)  (Notified that Carter Cruz talked with Nick and still okay)     Notified that Carter Cruz talked with Nick and still okay to discharge and follow up as outpatient

## 2017-11-12 NOTE — PROGRESS NOTES
Acct: [de-identified]  Admit Date:  11/7/2017  Primary Cardiologist: Susan Liu MD    Chief Complaint/Reason for Consultation: NSVT    Subjective (Events in last 24 hours):   Denies any CP, SOB or palpitations. Had a 8 beat run of NSVT.       Objective:   BP (!) 143/77   Pulse 64   Temp 98 °F (36.7 °C) (Oral)   Resp 18   Ht 5' 9\" (1.753 m)   Wt 131 lb 14.4 oz (59.8 kg)   SpO2 97%   BMI 19.48 kg/m²        TELEMETRY: paced    Physical Exam:  General Appearance: alert and oriented to person, place and time, in no acute distress  Cardiovascular: normal rate, regular rhythm, normal S1 and S2, no murmurs, rubs, clicks, or gallops, distal pulses intact, no carotid bruits, no JVD  Pulmonary/Chest: clear to auscultation bilaterally- no wheezes, rales or rhonchi, normal air movement, no respiratory distress  Abdomen: soft, non-tender, non-distended, normal bowel sounds, no masses Extremities: no cyanosis, clubbing or edema, pulse   Skin: warm and dry, no rash or erythema  Head: normocephalic and atraumatic  Eyes: pupils equal, round, and reactive to light  Neck: supple and non-tender without mass, no thyromegaly   Musculoskeletal: normal range of motion, no joint swelling, deformity or tenderness  Neurological: alert, oriented, normal speech, no focal findings or movement disorder noted    Medications:    metoprolol tartrate  25 mg Oral BID    bumetanide  0.5 mg Intravenous Once    insulin lispro  0-18 Units Subcutaneous TID WC    insulin lispro  0-9 Units Subcutaneous Nightly    glimepiride  4 mg PEG Tube QAM AC    warfarin (COUMADIN) daily dosing (placeholder)   Other RX Placeholder    cefTRIAXone (ROCEPHIN) IV  1 g Intravenous Q24H    aspirin  81 mg Oral Daily    atorvastatin  20 mg Oral Nightly    clopidogrel  75 mg Oral Daily    pantoprazole  40 mg Oral QAM AC    isosorbide dinitrate  20 mg Oral TID    sodium chloride flush  10 mL Intravenous 2 times per day      dextrose         glucose 15 g PRN dextrose 12.5 g PRN   glucagon (rDNA) 1 mg PRN   dextrose 100 mL/hr PRN   acetaminophen 650 mg Q4H PRN   oxyCODONE 2.5 mg Q6H PRN   nitroGLYCERIN 0.4 mg Q5 Min PRN   sodium chloride flush 10 mL PRN   magnesium hydroxide 30 mL Daily PRN   ondansetron 4 mg Q6H PRN       Diagnostics:  EKG:  Paced     Echo: 8/23/2017  Conclusions      Summary   Normal left ventricle size and systolic function. Ejection fraction was   estimated at 60 %. There were no regional left ventricular wall motion   abnormalities and wall thickness was within normal limits.   The left atrium is Severely dilated.   Mild to Moderately enlarged right atrium size.   Aortic valve appears tricuspid. Aortic valve leaflets are Severely   calcified. Leaflets exhibited severely increased thickness and severely   reduced cuspal separation of the aortic valve. Mild aortic regurgitation   is noted.   There is severe aortic stenosis with valve area of 0.9 sq cm. The maximum   aortic valve gradient is 62 mmhg mmHg, the mean gradient is 40 mmHg, and   the peak velocity is 4 cm/s.     Cardiac catheterization 9/20/2017  GRAFTS:  LIMA to LAD is patent. SVG to OM is patent. SVG to second  OM is patent with 80% proximal stenosis. SVG to diagonal is patent.     INTERVENTION:  Although the SVG to one of the OMs is patent, it  demonstrated an 80% proximal stenosis. Given that he is pre-TAVR with  anginal equivalent symptoms and this OM is supplied to large distribution,   we elected to perform PCI of the vessel. We upsized the 5-Maldivian femoral   artery sheath to 6-Maldivian. IV Angiomax was given and confirmed; ACT of greater   than 250 seconds. We then used a JR4 guiding catheter and cannulated the  graft. Once cannulated, we wired the graft to the native OM with a BMW  wire without issue. We then attempted to place a spider 4.0 filter  wire into the graft. However, given the significant stenosis it would not pass.   There was high risk of debris embolization Electronically signed by Maria Teresa Riddle PA-C on 11/12/2017 at 9:11 AM

## 2017-11-12 NOTE — CONSULTS
Inpatient consult to Cardiology  Consult performed by: Ilan Blake ordered by: Pauly Bee PN/UTI  NSVT  Severe AS  CAD s/p CABG and later multiple stent  HTN  HLP  [employer] pacer 2014  PAFLUT      PLAN  MED RX  F/u CARDIOLOGY  WANT WAIT STILL FOR TAVR  STOP bUMEX 1 MG IV BID ( GOT ONE DOSE ALREADY)  MG 1 GM  LOPRESSOR 12.5 PO BID  pACER INTERROGATION REVIEWED AND LOOK GOOD    [id number]    Loly Whitfield MD
BNP, and Bumex IV already 1 mg has been given today, but  did not have any chest x-ray. The patient did not have any fever or  chills. 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 PAST SURGICAL HISTORY:  Cardiac surgery with coronary artery bypass,  colonoscopy, cholecystectomy, four-vessel bypass, esophageal dilation,  fracture surgery, gastric tube placement, hernia repair, pacemaker  placement, and upper GI endoscopy. FAMILY HISTORY:  There is a family history of hypertension, stroke,  coronary artery disease, and diabetes. SOCIAL HISTORY:  He is a former smoker, quit in 1950. No alcohol. No drug  use. MEDICATIONS:  Prior to admission include the following:  Norco,  amoxycillin, diclofenac, Lipitor, Glucophage, Isordil, warfarin, Tylenol,  aspirin, and Nexium. ALLERGIES:  The patient has no known drug allergies. REVIEW OF SYSTEMS:  The rest of the review of systems is negative except  for the above-mentioned ones. PHYSICAL EXAMINATION:  GENERAL:  Looks sick, in no form of distress. VITAL SIGNS:  Blood pressure 150/68, pulse rate 64, respiratory rate 18,  temperature 98.4. He was afebrile when he came in. HEENT:  Slightly pale conjunctivae. Anicteric sclerae. Pupils are equal  and reactive. NECK:  No lymphadenopathy, no goiter, no JVD. CHEST:  Clear to auscultation. Resonant to percussion. CARDIOVASCULAR:  Arteries felt; carotid, femoral, and pedal.  No bruit. S1  and S2 well heard. No murmur or gallop rhythm. ABDOMEN:  Soft, nontender, nondistended. Bowel sounds are positive with  PEG tube.   GENITALIA:  No CVA, flank, or suprapubic

## 2017-11-12 NOTE — DISCHARGE SUMMARY
91% 96% 96%   Weight:       Height:         Weight: Weight: 131 lb 14.4 oz (59.8 kg)     24 hour intake/output:No intake or output data in the 24 hours ending 11/13/17 2049      General appearance:  No apparent distress, elderly, chronically ill appearing, appears stated age and cooperative. HEENT:  Normal cephalic, atraumatic without obvious deformity. Pupils equal, round, and reactive to light. Extra ocular muscles intact. Conjunctivae/corneas clear. Neck: Supple, with full range of motion. No jugular venous distention. Trachea midline. Respiratory:  Normal respiratory effort. Clear to auscultation, with few soft rales. On room air. Cardiovascular:  Regular rate and rhythm with normal S1/S2. Holosystolic murmur heard throughout. Abdomen: Soft, non-tender, non-distended with normal bowel sounds. PEG in place   Musculoskeletal:  No clubbing, cyanosis or edema bilaterally. Full range of motion without deformity. Skin: Skin color, texture, turgor normal.  No rashes or lesions. Neurologic:  Neurovascularly intact without any focal sensory/motor deficits. Capillary Refill: Brisk,< 3 seconds   Peripheral Pulses: +2 palpable, equal bilaterally       Labs: For convenience and continuity at follow-up the following most recent labs are provided:      CBC:    Lab Results   Component Value Date    WBC 8.8 11/11/2017    HGB 9.3 11/11/2017    HCT 27.8 11/11/2017     11/11/2017       Renal:    Lab Results   Component Value Date     11/12/2017    K 3.5 11/12/2017     11/12/2017    CO2 22 11/12/2017    BUN 22 11/12/2017    CREATININE 0.5 11/12/2017    CALCIUM 8.8 11/12/2017    PHOS 2.5 10/18/2016         Significant Diagnostic Studies    Radiology:   XR Chest Portable   Final Result   1. Mild to moderate interstitial pulmonary edema. 2.  At least small left pleural effusion. 3.  Stable moderate cardiomegaly. **This report has been created using voice recognition software.  It may

## 2017-11-13 NOTE — PROGRESS NOTES
Certification of Necessity for Non-Emergency Transportation by [employer] form completed and faxed to [facility] [place] [address] for discharge to [facility]. [facility] called and stated it would be 1030-11pm before they would be able to transport patient. This nurse called [facility] and notified them of transport and son Glenys Cabot. Glenys Cabot stated he would be to the hospital shortly to visit his father.

## 2017-11-13 NOTE — CARE COORDINATION
11/13/17, 7:42 AM    Late Entry  Patient discharged on 11/11 to [facility] via [address] [facility]. RN notified patients son of discharge. RN faxed AVS, MAR and prescriptions and called report. Discharge plan discussed by  and . Discharge plan reviewed with patient/ family. Patient/ family verbalize understanding of discharge plan and are in agreement with plan. Understanding was demonstrated using the teach back method.        IMM Letter  IMM Letter given to Patient/Family/Significant other/Guardian/POA/by[de-identified] Sera Flores CM   IMM Letter date given[de-identified] 11/10/17  IMM Letter time given[de-identified] [id number]

## 2017-11-14 NOTE — ED PROVIDER NOTES
Eyes: Right pupil is not reactive. Left pupil is not reactive. Pupils fixed and dilated    Cardiovascular:   Pulses:       Carotid pulses are 0 on the right side, and 0 on the left side. Radial pulses are 0 on the right side, and 0 on the left side. Femoral pulses are 0 on the right side, and 0 on the left side. No spontaneous cardiac activity found. Pulmonary/Chest: He is intubated. No spontaneous respirations. Neurological: He is unresponsive. GCS eye subscore is 1. GCS verbal subscore is 1. GCS motor subscore is 1. Skin: Skin is intact. DIFFERENTIAL DIAGNOSIS:   Cardiopulmonary arrest.    DIAGNOSTIC RESULTS     EKG: All EKG's are interpreted by the Emergency Department Physician who either signs or Co-signs this chart in the absence of a cardiologist.  None    RADIOLOGY: non-plain film images(s) such as CT, Ultrasound and MRI are read by the radiologist.  None    LABS:   Labs Reviewed - No data to display    EMERGENCY DEPARTMENT COURSE:   Vitals:    Vitals:    11/13/17 2004 11/13/17 2009 11/13/17 2032   BP:  (!) 149/95    Pulse: 75 (!) 30    Resp: 12     Temp:   94.1 °F (34.5 °C)   TempSrc:   Rectal   SpO2:  (!) 78%      MDM:  7:58 PM- Patient arrived to the ED via EMS while in cardiopulmonary arrest. Patient was met in the room for immediate evaluation upon arrival. Patient arrived to the ED unresponsive with damaris life vest in place performing compressions. No pulses were able to be palpated. No spontaneous cardiac activity on bedside ultrasound was found. There were no spontaneous respirations. Patient was intubated prior to arrival. He was given 5 of epi and one of bicarb PTA. Pupils were fixed and dilated. Medications were given as listed below. 8:00 PM- Pulse check. No pulses palpated. No cardiac activity found on bedside ultrasound. Patient was in ventricular tachycardia on telemetry monitor. There were no spontaneous respirations. [employer] remains in place.

## 2018-04-12 PROBLEM — N39.0 URINARY TRACT INFECTION: Status: RESOLVED | Noted: 2017-01-01 | Resolved: 2018-04-12

## (undated) DEVICE — Device